# Patient Record
Sex: FEMALE | Race: WHITE | NOT HISPANIC OR LATINO
[De-identification: names, ages, dates, MRNs, and addresses within clinical notes are randomized per-mention and may not be internally consistent; named-entity substitution may affect disease eponyms.]

---

## 2017-11-17 ENCOUNTER — APPOINTMENT (OUTPATIENT)
Dept: OBGYN | Facility: CLINIC | Age: 22
End: 2017-11-17
Payer: COMMERCIAL

## 2017-11-17 VITALS
DIASTOLIC BLOOD PRESSURE: 60 MMHG | OXYGEN SATURATION: 98 % | HEIGHT: 60 IN | WEIGHT: 115 LBS | HEART RATE: 83 BPM | BODY MASS INDEX: 22.58 KG/M2 | SYSTOLIC BLOOD PRESSURE: 106 MMHG

## 2017-11-17 DIAGNOSIS — Z82.49 FAMILY HISTORY OF ISCHEMIC HEART DISEASE AND OTHER DISEASES OF THE CIRCULATORY SYSTEM: ICD-10-CM

## 2017-11-17 PROCEDURE — 99385 PREV VISIT NEW AGE 18-39: CPT

## 2017-11-17 RX ORDER — NEOMYCIN/POLYMYXIN B/PRAMOXINE 3.5-10K-1
CREAM (GRAM) TOPICAL
Refills: 0 | Status: ACTIVE | COMMUNITY

## 2017-11-17 RX ORDER — UBIDECARENONE/VIT E ACET 100MG-5
CAPSULE ORAL
Refills: 0 | Status: ACTIVE | COMMUNITY

## 2017-11-26 LAB — CYTOLOGY CVX/VAG DOC THIN PREP: NORMAL

## 2018-03-13 ENCOUNTER — LABORATORY RESULT (OUTPATIENT)
Age: 23
End: 2018-03-13

## 2018-03-13 ENCOUNTER — APPOINTMENT (OUTPATIENT)
Dept: FAMILY MEDICINE | Facility: CLINIC | Age: 23
End: 2018-03-13
Payer: COMMERCIAL

## 2018-03-13 VITALS
HEIGHT: 60 IN | TEMPERATURE: 98.1 F | WEIGHT: 118 LBS | BODY MASS INDEX: 23.16 KG/M2 | DIASTOLIC BLOOD PRESSURE: 54 MMHG | RESPIRATION RATE: 14 BRPM | HEART RATE: 86 BPM | OXYGEN SATURATION: 98 % | SYSTOLIC BLOOD PRESSURE: 92 MMHG

## 2018-03-13 DIAGNOSIS — B96.89 PERSONAL HISTORY OF OTHER DISEASES OF THE RESPIRATORY SYSTEM: ICD-10-CM

## 2018-03-13 DIAGNOSIS — Z87.09 PERSONAL HISTORY OF OTHER DISEASES OF THE RESPIRATORY SYSTEM: ICD-10-CM

## 2018-03-13 DIAGNOSIS — Z00.00 ENCOUNTER FOR GENERAL ADULT MEDICAL EXAMINATION W/OUT ABNORMAL FINDINGS: ICD-10-CM

## 2018-03-13 PROCEDURE — 99202 OFFICE O/P NEW SF 15 MIN: CPT

## 2018-03-13 RX ORDER — FLUTICASONE PROPIONATE 50 UG/1
50 SPRAY, METERED NASAL
Refills: 0 | Status: DISCONTINUED | COMMUNITY
End: 2018-03-13

## 2018-04-05 LAB
25(OH)D3 SERPL-MCNC: 36.3 NG/ML
ALBUMIN SERPL ELPH-MCNC: 4.6 G/DL
ALP BLD-CCNC: 58 U/L
ALT SERPL-CCNC: 10 U/L
ANION GAP SERPL CALC-SCNC: 13 MMOL/L
APPEARANCE: ABNORMAL
AST SERPL-CCNC: 13 U/L
BASOPHILS # BLD AUTO: 0.03 K/UL
BASOPHILS NFR BLD AUTO: 0.5 %
BILIRUB SERPL-MCNC: 1.2 MG/DL
BILIRUBIN URINE: NEGATIVE
BLOOD URINE: NEGATIVE
BUN SERPL-MCNC: 10 MG/DL
CALCIUM SERPL-MCNC: 9.8 MG/DL
CHLORIDE SERPL-SCNC: 102 MMOL/L
CHOLEST SERPL-MCNC: 204 MG/DL
CHOLEST/HDLC SERPL: 3.3 RATIO
CO2 SERPL-SCNC: 24 MMOL/L
COLOR: YELLOW
CREAT SERPL-MCNC: 0.79 MG/DL
EOSINOPHIL # BLD AUTO: 0.06 K/UL
EOSINOPHIL NFR BLD AUTO: 1.1 %
GLUCOSE QUALITATIVE U: NEGATIVE MG/DL
GLUCOSE SERPL-MCNC: 97 MG/DL
HCT VFR BLD CALC: 39.1 %
HDLC SERPL-MCNC: 62 MG/DL
HGB BLD-MCNC: 12.8 G/DL
IMM GRANULOCYTES NFR BLD AUTO: 0.2 %
KETONES URINE: NEGATIVE
LDLC SERPL CALC-MCNC: 128 MG/DL
LEUKOCYTE ESTERASE URINE: ABNORMAL
LYMPHOCYTES # BLD AUTO: 1.83 K/UL
LYMPHOCYTES NFR BLD AUTO: 32.9 %
MAN DIFF?: NORMAL
MCHC RBC-ENTMCNC: 29 PG
MCHC RBC-ENTMCNC: 32.7 GM/DL
MCV RBC AUTO: 88.7 FL
MEV IGG FLD QL IA: >300 AU/ML
MEV IGG+IGM SER-IMP: POSITIVE
MONOCYTES # BLD AUTO: 0.4 K/UL
MONOCYTES NFR BLD AUTO: 7.2 %
MUV AB SER-ACNC: POSITIVE
MUV IGG SER QL IA: 236 AU/ML
NEUTROPHILS # BLD AUTO: 3.24 K/UL
NEUTROPHILS NFR BLD AUTO: 58.1 %
NITRITE URINE: NEGATIVE
PH URINE: 8
PLATELET # BLD AUTO: 276 K/UL
POTASSIUM SERPL-SCNC: 4.1 MMOL/L
PROT SERPL-MCNC: 7.2 G/DL
PROTEIN URINE: 30 MG/DL
RBC # BLD: 4.41 M/UL
RBC # FLD: 12.3 %
RUBV IGG FLD-ACNC: 3.5 INDEX
RUBV IGG SER-IMP: POSITIVE
SODIUM SERPL-SCNC: 139 MMOL/L
SPECIFIC GRAVITY URINE: 1.03
TRIGL SERPL-MCNC: 72 MG/DL
TSH SERPL-ACNC: 1.3 UIU/ML
UROBILINOGEN URINE: NEGATIVE MG/DL
VZV AB TITR SER: POSITIVE
VZV IGG SER IF-ACNC: 641.9 INDEX
WBC # FLD AUTO: 5.57 K/UL

## 2018-04-12 ENCOUNTER — APPOINTMENT (OUTPATIENT)
Dept: FAMILY MEDICINE | Facility: CLINIC | Age: 23
End: 2018-04-12
Payer: COMMERCIAL

## 2018-04-12 ENCOUNTER — TRANSCRIPTION ENCOUNTER (OUTPATIENT)
Age: 23
End: 2018-04-12

## 2018-04-12 VITALS
BODY MASS INDEX: 23.99 KG/M2 | HEART RATE: 100 BPM | HEIGHT: 59 IN | DIASTOLIC BLOOD PRESSURE: 60 MMHG | OXYGEN SATURATION: 99 % | WEIGHT: 119 LBS | SYSTOLIC BLOOD PRESSURE: 96 MMHG | TEMPERATURE: 97.7 F

## 2018-04-12 DIAGNOSIS — Z23 ENCOUNTER FOR IMMUNIZATION: ICD-10-CM

## 2018-04-12 PROCEDURE — 99395 PREV VISIT EST AGE 18-39: CPT | Mod: 25

## 2018-04-12 PROCEDURE — 90715 TDAP VACCINE 7 YRS/> IM: CPT

## 2018-04-12 PROCEDURE — 99385 PREV VISIT NEW AGE 18-39: CPT | Mod: 25

## 2018-04-12 PROCEDURE — G0009: CPT

## 2018-04-12 PROCEDURE — 90472 IMMUNIZATION ADMIN EACH ADD: CPT

## 2018-04-12 PROCEDURE — 90732 PPSV23 VACC 2 YRS+ SUBQ/IM: CPT

## 2018-06-29 ENCOUNTER — EMERGENCY (EMERGENCY)
Facility: HOSPITAL | Age: 23
LOS: 1 days | Discharge: HOME | End: 2018-06-29
Attending: EMERGENCY MEDICINE | Admitting: EMERGENCY MEDICINE
Payer: COMMERCIAL

## 2018-06-29 VITALS
OXYGEN SATURATION: 100 % | HEIGHT: 60 IN | RESPIRATION RATE: 19 BRPM | SYSTOLIC BLOOD PRESSURE: 97 MMHG | TEMPERATURE: 98 F | WEIGHT: 113.98 LBS | HEART RATE: 98 BPM | DIASTOLIC BLOOD PRESSURE: 69 MMHG

## 2018-06-29 VITALS
HEART RATE: 88 BPM | DIASTOLIC BLOOD PRESSURE: 62 MMHG | SYSTOLIC BLOOD PRESSURE: 105 MMHG | OXYGEN SATURATION: 100 % | RESPIRATION RATE: 18 BRPM

## 2018-06-29 PROCEDURE — 99284 EMERGENCY DEPT VISIT MOD MDM: CPT

## 2018-06-29 PROCEDURE — 99283 EMERGENCY DEPT VISIT LOW MDM: CPT

## 2018-06-29 RX ORDER — BECLOMETHASONE DIPROPIONATE 40 UG/1
2 AEROSOL, METERED RESPIRATORY (INHALATION)
Qty: 1 | Refills: 0
Start: 2018-06-29

## 2018-06-29 RX ORDER — AZITHROMYCIN 500 MG/1
2 TABLET, FILM COATED ORAL
Qty: 6 | Refills: 0
Start: 2018-06-29 | End: 2018-06-29

## 2018-06-29 NOTE — ED PROVIDER NOTE - OBJECTIVE STATEMENT
pt c/o severe cough for last 2 days, productive of yellow sputum assoc c nasal congestion. no sob. nofever/chills. no throat pain. no chestpain

## 2018-06-29 NOTE — ED PROVIDER NOTE - MEDICAL DECISION MAKING DETAILS
cough/congestion. lungs clear. nl vitals. no pna. likely bronchitis. will treat c tessalon, zpak. pmd fu

## 2018-08-06 ENCOUNTER — EMERGENCY (EMERGENCY)
Facility: HOSPITAL | Age: 23
LOS: 1 days | Discharge: HOME | End: 2018-08-06
Attending: EMERGENCY MEDICINE | Admitting: EMERGENCY MEDICINE
Payer: COMMERCIAL

## 2018-08-06 VITALS
OXYGEN SATURATION: 98 % | DIASTOLIC BLOOD PRESSURE: 73 MMHG | WEIGHT: 110.23 LBS | SYSTOLIC BLOOD PRESSURE: 116 MMHG | RESPIRATION RATE: 16 BRPM | TEMPERATURE: 98 F | HEART RATE: 89 BPM

## 2018-08-06 PROCEDURE — 99283 EMERGENCY DEPT VISIT LOW MDM: CPT

## 2018-08-06 RX ADMIN — Medication 1 TABLET(S): at 20:30

## 2018-08-06 NOTE — ED ADULT NURSE NOTE - NSIMPLEMENTINTERV_GEN_ALL_ED
Implemented All Universal Safety Interventions:  [place] to call system. Call bell, personal items and telephone within reach. Instruct patient to call for assistance. Room bathroom lighting operational. Non-slip footwear when patient is off stretcher. Physically safe environment: no spills, clutter or unnecessary equipment. Stretcher in lowest position, wheels locked, appropriate side rails in place.

## 2018-08-06 NOTE — ED PROVIDER NOTE - OBJECTIVE STATEMENT
Pertinent PMH/PSH/FHx/SHx and Review of Systems contained within:  22 y/o F presents to Ed c/o drainage from wound on right forearm for few days

## 2018-08-06 NOTE — ED ADULT TRIAGE NOTE - CHIEF COMPLAINT QUOTE
right forearm redness and swelling patient unsure how she sustained injury x1week denies fever or chills

## 2018-08-06 NOTE — ED PROVIDER NOTE - SKIN WOUND TYPE
PUNCTURE WOUND(S)/small infected wound with purulent discharge on lateral aspect of right forearm with with about 2 cm circular area of erythema and and minor cellulitis

## 2019-01-10 ENCOUNTER — APPOINTMENT (OUTPATIENT)
Dept: FAMILY MEDICINE | Facility: CLINIC | Age: 24
End: 2019-01-10
Payer: MEDICAID

## 2019-01-10 ENCOUNTER — RX RENEWAL (OUTPATIENT)
Age: 24
End: 2019-01-10

## 2019-01-10 VITALS
WEIGHT: 111 LBS | BODY MASS INDEX: 22.38 KG/M2 | SYSTOLIC BLOOD PRESSURE: 108 MMHG | DIASTOLIC BLOOD PRESSURE: 68 MMHG | TEMPERATURE: 98.4 F | RESPIRATION RATE: 16 BRPM | HEIGHT: 59 IN | OXYGEN SATURATION: 98 % | HEART RATE: 80 BPM

## 2019-01-10 PROCEDURE — 99214 OFFICE O/P EST MOD 30 MIN: CPT

## 2019-01-10 RX ORDER — AMOXICILLIN AND CLAVULANATE POTASSIUM 875; 125 MG/1; MG/1
875-125 TABLET, COATED ORAL
Qty: 14 | Refills: 0 | Status: COMPLETED | COMMUNITY
Start: 2018-09-21

## 2019-01-10 RX ORDER — 1.1% SODIUM FLUORIDE PRESCRIPTION DENTAL CREAM 5 MG/G
1.1 CREAM DENTAL
Qty: 51 | Refills: 0 | Status: COMPLETED | COMMUNITY
Start: 2018-06-26

## 2019-01-10 RX ORDER — SULFAMETHOXAZOLE AND TRIMETHOPRIM 800; 160 MG/1; MG/1
800-160 TABLET ORAL
Qty: 20 | Refills: 0 | Status: COMPLETED | COMMUNITY
Start: 2018-08-06

## 2019-01-10 NOTE — REVIEW OF SYSTEMS
[Nasal Discharge] : nasal discharge [Sore Throat] : sore throat [Shortness Of Breath] : no shortness of breath [Wheezing] : no wheezing [Cough] : cough [Dyspnea on Exertion] : no dyspnea on exertion [Negative] : Musculoskeletal

## 2019-01-10 NOTE — PHYSICAL EXAM
[No Acute Distress] : no acute distress [Normal Outer Ear/Nose] : the outer ears and nose were normal in appearance [No JVD] : no jugular venous distention [No Respiratory Distress] : no respiratory distress  [No Accessory Muscle Use] : no accessory muscle use [Normal Rate] : normal rate  [Regular Rhythm] : with a regular rhythm [Normal S1, S2] : normal S1 and S2 [No Varicosities] : no varicosities [No Edema] : there was no peripheral edema [Soft] : abdomen soft [Non Tender] : non-tender [Non-distended] : non-distended [No HSM] : no HSM [Normal Bowel Sounds] : normal bowel sounds [de-identified] : + pharynx erythema, + nasal turbines edema  [de-identified] : + wheezing

## 2019-01-10 NOTE — HISTORY OF PRESENT ILLNESS
[Congestion] : congestion [Cough] : cough [Sore Throat] : sore throat [Moderate] : moderate [___ Days ago] : [unfilled] days ago [Chills] : chills [Fever] : fever [Worsening] : worsening [Wheezing] : no wheezing [Anorexia] : no anorexia [Shortness Of Breath] : no shortness of breath [Fatigue] : not fatigue [Headache] : no headache [FreeTextEntry8] : Patient started Proventil no improvement. Mother sick too. Patient  run out off Singular.

## 2019-02-15 ENCOUNTER — APPOINTMENT (OUTPATIENT)
Dept: FAMILY MEDICINE | Facility: CLINIC | Age: 24
End: 2019-02-15
Payer: MEDICAID

## 2019-02-15 VITALS
BODY MASS INDEX: 21.77 KG/M2 | WEIGHT: 108 LBS | HEIGHT: 59 IN | TEMPERATURE: 98.5 F | DIASTOLIC BLOOD PRESSURE: 64 MMHG | OXYGEN SATURATION: 98 % | HEART RATE: 85 BPM | SYSTOLIC BLOOD PRESSURE: 98 MMHG

## 2019-02-15 DIAGNOSIS — B97.89 ACUTE UPPER RESPIRATORY INFECTION, UNSPECIFIED: ICD-10-CM

## 2019-02-15 DIAGNOSIS — J06.9 ACUTE UPPER RESPIRATORY INFECTION, UNSPECIFIED: ICD-10-CM

## 2019-02-15 PROCEDURE — 99214 OFFICE O/P EST MOD 30 MIN: CPT

## 2019-02-15 RX ORDER — LORATADINE 5 MG/5 ML
10 SOLUTION, ORAL ORAL
Refills: 0 | Status: DISCONTINUED | COMMUNITY
End: 2019-02-15

## 2019-02-15 RX ORDER — BECLOMETHASONE DIPROPIONATE 80 UG/1
80 AEROSOL, METERED RESPIRATORY (INHALATION) TWICE DAILY
Qty: 1 | Refills: 1 | Status: DISCONTINUED | COMMUNITY
Start: 2018-03-13 | End: 2019-02-15

## 2019-02-15 RX ORDER — BENZONATATE 200 MG/1
200 CAPSULE ORAL 3 TIMES DAILY
Qty: 20 | Refills: 1 | Status: DISCONTINUED | COMMUNITY
Start: 2019-01-10 | End: 2019-02-15

## 2019-02-15 RX ORDER — AMOXICILLIN AND CLAVULANATE POTASSIUM 875; 125 MG/1; MG/1
875-125 TABLET, COATED ORAL
Qty: 14 | Refills: 0 | Status: DISCONTINUED | COMMUNITY
Start: 2019-01-10 | End: 2019-02-15

## 2019-02-15 RX ORDER — CETIRIZINE HYDROCHLORIDE 10 MG/1
10 CAPSULE, LIQUID FILLED ORAL
Qty: 15 | Refills: 0 | Status: ACTIVE | COMMUNITY
Start: 2019-02-15 | End: 1900-01-01

## 2019-02-15 NOTE — HISTORY OF PRESENT ILLNESS
[Moderate] : moderate [___ Weeks ago] :  [unfilled] weeks ago [Gradual] : gradually [Constant] : constant [Cough] : cough [Sore Throat] : sore throat [Stable] : stable [Congestion] : no congestion [Wheezing] : no wheezing [Chills] : no chills [Anorexia] : no anorexia [Shortness Of Breath] : no shortness of breath [Earache] : no earache [Fatigue] : not fatigue [Headache] : no headache [Fever] : no fever [FreeTextEntry5] : none [FreeTextEntry4] : unchanged [FreeTextEntry6] : same all week [FreeTextEntry8] : Pt is a 22 yo female presents today with mother symptoms of cough and sore throat for a week. Denies dyspnea, wheezing, SOB, fever, chills. \par Pt does work in dog grooming and  spa. As per mother, also concerned if the pet dander is aggravating her cough symptoms and asthma. Pt denies being allergic to dogs in the past.

## 2019-02-15 NOTE — PHYSICAL EXAM
[No Acute Distress] : no acute distress [Well Nourished] : well nourished [Well-Appearing] : well-appearing [PERRL] : pupils equal round and reactive to light [EOMI] : extraocular movements intact [Normal Oropharynx] : the oropharynx was normal [Supple] : supple [No Respiratory Distress] : no respiratory distress  [Clear to Auscultation] : lungs were clear to auscultation bilaterally [No Accessory Muscle Use] : no accessory muscle use [Normal Rate] : normal rate  [Normal S1, S2] : normal S1 and S2 [No Edema] : there was no peripheral edema [Soft] : abdomen soft [Non Tender] : non-tender [Normal Anterior Cervical Nodes] : no anterior cervical lymphadenopathy [No Rash] : no rash [Normal Gait] : normal gait [de-identified] : no tonsilar enlargement, no erythema, no exudates

## 2019-02-15 NOTE — REVIEW OF SYSTEMS
[Sore Throat] : sore throat [Cough] : cough [Negative] : Heme/Lymph [Fever] : no fever [Chills] : no chills [Nasal Discharge] : no nasal discharge [Chest Pain] : no chest pain [Shortness Of Breath] : no shortness of breath [Wheezing] : no wheezing [Dyspnea on Exertion] : no dyspnea on exertion [Abdominal Pain] : no abdominal pain [Dysuria] : no dysuria [Frequency] : no frequency [Skin Rash] : no skin rash [Suicidal] : not suicidal [Insomnia] : no insomnia [Anxiety] : no anxiety

## 2019-04-08 NOTE — ED PROVIDER NOTE - CONTEXT
unknown
[email or web address]

## 2019-05-09 ENCOUNTER — TRANSCRIPTION ENCOUNTER (OUTPATIENT)
Age: 24
End: 2019-05-09

## 2019-06-25 NOTE — ED ADULT NURSE NOTE - PSH
----- Message from Felicia Valenzuela sent at 6/25/2019  2:57 PM CDT -----  ..Type:  Patient Returning Call    Who Called:pt   Who Left Message for Patient:  Does the patient know what this is regarding?: birth control  Would the patient rather a call back or a response via [facility]? Call back   Best Call Back Number: [phone]  Additional Information: pt is requesting a call from nurse to schedule birth control injection    No significant past surgical history

## 2020-01-23 ENCOUNTER — RX RENEWAL (OUTPATIENT)
Age: 25
End: 2020-01-23

## 2020-02-23 ENCOUNTER — RX RENEWAL (OUTPATIENT)
Age: 25
End: 2020-02-23

## 2020-06-23 ENCOUNTER — TRANSCRIPTION ENCOUNTER (OUTPATIENT)
Age: 25
End: 2020-06-23

## 2020-10-27 ENCOUNTER — APPOINTMENT (OUTPATIENT)
Dept: FAMILY MEDICINE | Facility: CLINIC | Age: 25
End: 2020-10-27
Payer: MEDICAID

## 2020-10-27 VITALS
TEMPERATURE: 98.3 F | HEART RATE: 99 BPM | DIASTOLIC BLOOD PRESSURE: 72 MMHG | WEIGHT: 145 LBS | SYSTOLIC BLOOD PRESSURE: 108 MMHG | RESPIRATION RATE: 16 BRPM | OXYGEN SATURATION: 100 % | HEIGHT: 59 IN | BODY MASS INDEX: 29.23 KG/M2

## 2020-10-27 DIAGNOSIS — J45.909 UNSPECIFIED ASTHMA, UNCOMPLICATED: ICD-10-CM

## 2020-10-27 PROCEDURE — G0008: CPT

## 2020-10-27 PROCEDURE — 99072 ADDL SUPL MATRL&STAF TM PHE: CPT

## 2020-10-27 PROCEDURE — 90686 IIV4 VACC NO PRSV 0.5 ML IM: CPT

## 2020-10-27 PROCEDURE — 99214 OFFICE O/P EST MOD 30 MIN: CPT | Mod: 25

## 2020-10-27 RX ORDER — PROMETHAZINE HYDROCHLORIDE AND DEXTROMETHORPHAN HYDROBROMIDE ORAL SOLUTION 15; 6.25 MG/5ML; MG/5ML
6.25-15 SOLUTION ORAL
Qty: 1 | Refills: 0 | Status: COMPLETED | COMMUNITY
Start: 2019-02-15 | End: 2020-10-27

## 2020-10-27 RX ORDER — BENZONATATE 100 MG/1
100 CAPSULE ORAL
Qty: 30 | Refills: 0 | Status: COMPLETED | COMMUNITY
Start: 2019-02-15 | End: 2020-10-27

## 2020-10-27 NOTE — HEALTH RISK ASSESSMENT
[] : No [1 or 2 (0 pts)] : 1 or 2 (0 points) [Never (0 pts)] : Never (0 points) [No] : In the past 12 months have you used drugs other than those required for medical reasons? No [0] : 2) Feeling down, depressed, or hopeless: Not at all (0) [de-identified] : No [Audit-CScore] : 0 [de-identified] : walking  [de-identified] : Regulax  [IAC2Nfcuc] : 0

## 2020-10-27 NOTE — REVIEW OF SYSTEMS
[Abdominal Pain] : no abdominal pain [Nausea] : nausea [Constipation] : no constipation [Diarrhea] : diarrhea [Vomiting] : vomiting [Heartburn] : no heartburn [Melena] : no melena [Negative] : Psychiatric

## 2020-10-27 NOTE — COUNSELING
[Benefits of weight loss discussed] : Benefits of weight loss discussed [Encouraged to maintain food diary] : Encouraged to maintain food diary [Encouraged to increase physical activity] : Encouraged to increase physical activity [Encouraged to use exercise tracking device] : Encouraged to use exercise tracking device [Weigh Self Weekly] : weigh self weekly [Decrease Portions] : decrease portions [____ min/wk Activity] : [unfilled] min/wk activity [Keep Food Diary] : keep food diary [Good understanding] : Patient has a good understanding of lifestyle changes and steps needed to achieve self management goal

## 2020-10-27 NOTE — HISTORY OF PRESENT ILLNESS
[Parent] : parent [FreeTextEntry1] : cc: asthma, nausea /vomiting ,Flu shot  [de-identified] : Patient was brought by her mother , she needs to f/u on her Asthma, needs a FLu shit, She c/o N/V on and off, no abd pain, chronic, no HA now ( Hx of HA in the past ), she deneis CP, + weight gain, when is hot she notice that it happens more often.

## 2020-10-27 NOTE — PHYSICAL EXAM
[No Acute Distress] : no acute distress [No Varicosities] : no varicosities [No Edema] : there was no peripheral edema [Alert and Oriented x3] : oriented to person, place, and time [Normal] : affect was normal and insight and judgment were intact

## 2020-11-24 ENCOUNTER — RX RENEWAL (OUTPATIENT)
Age: 25
End: 2020-11-24

## 2020-12-02 ENCOUNTER — NON-APPOINTMENT (OUTPATIENT)
Age: 25
End: 2020-12-02

## 2020-12-02 ENCOUNTER — APPOINTMENT (OUTPATIENT)
Dept: FAMILY MEDICINE | Facility: CLINIC | Age: 25
End: 2020-12-02
Payer: MEDICAID

## 2020-12-02 VITALS
WEIGHT: 146 LBS | HEIGHT: 59 IN | OXYGEN SATURATION: 97 % | BODY MASS INDEX: 29.43 KG/M2 | HEART RATE: 84 BPM | RESPIRATION RATE: 16 BRPM | SYSTOLIC BLOOD PRESSURE: 100 MMHG | TEMPERATURE: 98.2 F | DIASTOLIC BLOOD PRESSURE: 70 MMHG

## 2020-12-02 PROCEDURE — 99072 ADDL SUPL MATRL&STAF TM PHE: CPT

## 2020-12-02 PROCEDURE — 93000 ELECTROCARDIOGRAM COMPLETE: CPT

## 2020-12-02 PROCEDURE — 99395 PREV VISIT EST AGE 18-39: CPT | Mod: 25

## 2020-12-02 NOTE — PHYSICAL EXAM
[No Acute Distress] : no acute distress [Well Nourished] : well nourished [Well Developed] : well developed [Well-Appearing] : well-appearing [Normal Sclera/Conjunctiva] : normal sclera/conjunctiva [PERRL] : pupils equal round and reactive to light [EOMI] : extraocular movements intact [Normal Outer Ear/Nose] : the outer ears and nose were normal in appearance [Normal Oropharynx] : the oropharynx was normal [No JVD] : no jugular venous distention [Supple] : supple [No Respiratory Distress] : no respiratory distress  [Clear to Auscultation] : lungs were clear to auscultation bilaterally [No Accessory Muscle Use] : no accessory muscle use [Normal Rate] : normal rate  [Regular Rhythm] : with a regular rhythm [Normal S1, S2] : normal S1 and S2 [No Murmur] : no murmur heard [No Carotid Bruits] : no carotid bruits [No Abdominal Bruit] : a ~M bruit was not heard ~T in the abdomen [No Varicosities] : no varicosities [Pedal Pulses Present] : the pedal pulses are present [No Edema] : there was no peripheral edema [No Extremity Clubbing/Cyanosis] : no extremity clubbing/cyanosis [No Palpable Aorta] : no palpable aorta [Soft] : abdomen soft [Non Tender] : non-tender [Non-distended] : non-distended [No HSM] : no HSM [Normal Bowel Sounds] : normal bowel sounds [Normal Posterior Cervical Nodes] : no posterior cervical lymphadenopathy [Normal Anterior Cervical Nodes] : no anterior cervical lymphadenopathy [No CVA Tenderness] : no CVA  tenderness [No Spinal Tenderness] : no spinal tenderness [No Joint Swelling] : no joint swelling [Grossly Normal Strength/Tone] : grossly normal strength/tone [No Rash] : no rash [Normal Gait] : normal gait [Coordination Grossly Intact] : coordination grossly intact [No Focal Deficits] : no focal deficits [Deep Tendon Reflexes (DTR)] : deep tendon reflexes were 2+ and symmetric [Normal Affect] : the affect was normal [Alert and Oriented x3] : oriented to person, place, and time [Normal Insight/Judgement] : insight and judgment were intact [Normal Appearance] : normal in appearance [No Masses] : no palpable masses [No Nipple Discharge] : no nipple discharge [No Axillary Lymphadenopathy] : no axillary lymphadenopathy

## 2020-12-02 NOTE — HISTORY OF PRESENT ILLNESS
[FreeTextEntry1] : cc: physical  [de-identified] : Patient came  for physical. Denies CP,SOB,Abd pain, no N,V,C,D. Patient's N/V  improved. She did not have a chance to do blood work or US of  the  abd. Asthma well controlled .Pt c/o dry cough,on and off, probably repeated to Asthma \par

## 2020-12-02 NOTE — COUNSELING
[Activity counseling provided] : activity [Walking] : Walking [Benefits of weight loss discussed] : Benefits of weight loss discussed [Encouraged to maintain food diary] : Encouraged to maintain food diary [Encouraged to increase physical activity] : Encouraged to increase physical activity [Encouraged to use exercise tracking device] : Encouraged to use exercise tracking device [Weigh Self Weekly] : weigh self weekly [Decrease Portions] : decrease portions [____ min/wk Activity] : [unfilled] min/wk activity [Keep Food Diary] : keep food diary [Good understanding] : Patient has a good understanding of disease, goals and obesity follow-up plan

## 2020-12-02 NOTE — HEALTH RISK ASSESSMENT
[Very Good] : ~his/her~  mood as very good [1 or 2 (0 pts)] : 1 or 2 (0 points) [Never (0 pts)] : Never (0 points) [No] : In the past 12 months have you used drugs other than those required for medical reasons? No [Patient reported PAP Smear was normal] : Patient reported PAP Smear was normal [HIV Test offered] : HIV Test offered [Hepatitis C test offered] : Hepatitis C test offered [None] : None [With Family] : lives with family [Unemployed] : unemployed [High School] : high school [Single] : single [Feels Safe at Home] : Feels safe at home [Smoke Detector] : smoke detector [Carbon Monoxide Detector] : carbon monoxide detector [Seat Belt] :  uses seat belt [Sunscreen] : uses sunscreen [With Patient/Caregiver] : With Patient/Caregiver [Discussed at today's visit] : Advance Directives Discussed at today's visit [Aggressive treatment] : aggressive treatment [FreeTextEntry1] : + dry cough  [] : No [de-identified] : No [de-identified] : no [Audit-CScore] : 0 [de-identified] : none [de-identified] : regular  [Change in mental status noted] : No change in mental status noted [Language] : denies difficulty with language [Behavior] : denies difficulty with behavior [Reasoning] : denies difficulty with reasoning [Sexually Active] : not sexually active [Reports changes in hearing] : Reports no changes in hearing [Reports changes in vision] : Reports no changes in vision [Reports changes in dental health] : Reports no changes in dental health [MammogramDate] : never [MammogramComments] : NA [PapSmearDate] : 11/17 [BoneDensityDate] : never [BoneDensityComments] : NA [ColonoscopyDate] : never [ColonoscopyComments] : NA [AdvancecareDate] : 12/20

## 2020-12-05 ENCOUNTER — OUTPATIENT (OUTPATIENT)
Dept: OUTPATIENT SERVICES | Facility: HOSPITAL | Age: 25
LOS: 1 days | End: 2020-12-05
Payer: MEDICAID

## 2020-12-05 ENCOUNTER — APPOINTMENT (OUTPATIENT)
Dept: ULTRASOUND IMAGING | Facility: HOSPITAL | Age: 25
End: 2020-12-05
Payer: MEDICAID

## 2020-12-05 DIAGNOSIS — R11.2 NAUSEA WITH VOMITING, UNSPECIFIED: ICD-10-CM

## 2020-12-05 PROCEDURE — 76700 US EXAM ABDOM COMPLETE: CPT | Mod: 26

## 2020-12-05 PROCEDURE — 76700 US EXAM ABDOM COMPLETE: CPT

## 2020-12-06 LAB
ALBUMIN SERPL ELPH-MCNC: 4.7 G/DL
ALP BLD-CCNC: 85 U/L
ALT SERPL-CCNC: 19 U/L
AMYLASE/CREAT SERPL: 37 U/L
ANION GAP SERPL CALC-SCNC: 12 MMOL/L
APPEARANCE: CLEAR
AST SERPL-CCNC: 16 U/L
BASOPHILS # BLD AUTO: 0.05 K/UL
BASOPHILS NFR BLD AUTO: 0.9 %
BILIRUB SERPL-MCNC: 0.6 MG/DL
BILIRUBIN URINE: NEGATIVE
BLOOD URINE: NEGATIVE
BUN SERPL-MCNC: 10 MG/DL
CALCIUM SERPL-MCNC: 9.8 MG/DL
CHLORIDE SERPL-SCNC: 104 MMOL/L
CHOLEST SERPL-MCNC: 223 MG/DL
CO2 SERPL-SCNC: 26 MMOL/L
COLOR: NORMAL
CREAT SERPL-MCNC: 0.73 MG/DL
EOSINOPHIL # BLD AUTO: 0.1 K/UL
EOSINOPHIL NFR BLD AUTO: 1.8 %
ESTIMATED AVERAGE GLUCOSE: 103 MG/DL
GGT SERPL-CCNC: 13 U/L
GLUCOSE QUALITATIVE U: NEGATIVE
GLUCOSE SERPL-MCNC: 98 MG/DL
HBA1C MFR BLD HPLC: 5.2 %
HCT VFR BLD CALC: 41 %
HDLC SERPL-MCNC: 58 MG/DL
HGB BLD-MCNC: 13.3 G/DL
IMM GRANULOCYTES NFR BLD AUTO: 0.6 %
KETONES URINE: NEGATIVE
LDLC SERPL CALC-MCNC: 152 MG/DL
LEUKOCYTE ESTERASE URINE: NEGATIVE
LPL SERPL-CCNC: 31 U/L
LYMPHOCYTES # BLD AUTO: 1.6 K/UL
LYMPHOCYTES NFR BLD AUTO: 29.5 %
MAN DIFF?: NORMAL
MCHC RBC-ENTMCNC: 29.2 PG
MCHC RBC-ENTMCNC: 32.4 GM/DL
MCV RBC AUTO: 90.1 FL
MONOCYTES # BLD AUTO: 0.36 K/UL
MONOCYTES NFR BLD AUTO: 6.6 %
NEUTROPHILS # BLD AUTO: 3.29 K/UL
NEUTROPHILS NFR BLD AUTO: 60.6 %
NITRITE URINE: NEGATIVE
NONHDLC SERPL-MCNC: 165 MG/DL
PH URINE: 7.5
PLATELET # BLD AUTO: 314 K/UL
POTASSIUM SERPL-SCNC: 4.3 MMOL/L
PROT SERPL-MCNC: 7.2 G/DL
PROTEIN URINE: NEGATIVE
RBC # BLD: 4.55 M/UL
RBC # FLD: 11.9 %
SODIUM SERPL-SCNC: 142 MMOL/L
SPECIFIC GRAVITY URINE: 1.01
TRIGL SERPL-MCNC: 66 MG/DL
TSH SERPL-ACNC: 1.41 UIU/ML
URATE SERPL-MCNC: 4.6 MG/DL
UROBILINOGEN URINE: NORMAL
WBC # FLD AUTO: 5.43 K/UL

## 2020-12-20 ENCOUNTER — TRANSCRIPTION ENCOUNTER (OUTPATIENT)
Age: 25
End: 2020-12-20

## 2020-12-21 PROBLEM — J06.9 VIRAL URI WITH COUGH: Status: RESOLVED | Noted: 2019-02-15 | Resolved: 2020-12-21

## 2021-02-02 ENCOUNTER — TRANSCRIPTION ENCOUNTER (OUTPATIENT)
Age: 26
End: 2021-02-02

## 2021-03-08 ENCOUNTER — APPOINTMENT (OUTPATIENT)
Dept: FAMILY MEDICINE | Facility: CLINIC | Age: 26
End: 2021-03-08
Payer: MEDICAID

## 2021-03-08 VITALS
WEIGHT: 141 LBS | BODY MASS INDEX: 28.48 KG/M2 | TEMPERATURE: 98 F | DIASTOLIC BLOOD PRESSURE: 66 MMHG | RESPIRATION RATE: 14 BRPM | SYSTOLIC BLOOD PRESSURE: 100 MMHG | HEART RATE: 96 BPM | OXYGEN SATURATION: 98 %

## 2021-03-08 PROCEDURE — 99072 ADDL SUPL MATRL&STAF TM PHE: CPT

## 2021-03-08 PROCEDURE — 99214 OFFICE O/P EST MOD 30 MIN: CPT | Mod: 25

## 2021-03-09 LAB
ALBUMIN SERPL ELPH-MCNC: 4.7 G/DL
ALP BLD-CCNC: 79 U/L
ALT SERPL-CCNC: 15 U/L
ANION GAP SERPL CALC-SCNC: 12 MMOL/L
AST SERPL-CCNC: 16 U/L
BASOPHILS # BLD AUTO: 0.04 K/UL
BASOPHILS NFR BLD AUTO: 0.8 %
BILIRUB SERPL-MCNC: 0.6 MG/DL
BUN SERPL-MCNC: 9 MG/DL
CALCIUM SERPL-MCNC: 9.9 MG/DL
CHLORIDE SERPL-SCNC: 105 MMOL/L
CO2 SERPL-SCNC: 22 MMOL/L
CREAT SERPL-MCNC: 0.8 MG/DL
EOSINOPHIL # BLD AUTO: 0.07 K/UL
EOSINOPHIL NFR BLD AUTO: 1.4 %
GLUCOSE SERPL-MCNC: 81 MG/DL
HCT VFR BLD CALC: 40.1 %
HGB BLD-MCNC: 13.3 G/DL
IMM GRANULOCYTES NFR BLD AUTO: 0.4 %
INR PPP: 1.04 RATIO
LYMPHOCYTES # BLD AUTO: 1.49 K/UL
LYMPHOCYTES NFR BLD AUTO: 29.4 %
MAN DIFF?: NORMAL
MCHC RBC-ENTMCNC: 29 PG
MCHC RBC-ENTMCNC: 33.2 GM/DL
MCV RBC AUTO: 87.6 FL
MONOCYTES # BLD AUTO: 0.37 K/UL
MONOCYTES NFR BLD AUTO: 7.3 %
NEUTROPHILS # BLD AUTO: 3.07 K/UL
NEUTROPHILS NFR BLD AUTO: 60.7 %
PLATELET # BLD AUTO: 354 K/UL
POTASSIUM SERPL-SCNC: 4 MMOL/L
PROT SERPL-MCNC: 7.1 G/DL
PT BLD: 12.3 SEC
RBC # BLD: 4.58 M/UL
RBC # FLD: 11.9 %
SODIUM SERPL-SCNC: 140 MMOL/L
WBC # FLD AUTO: 5.06 K/UL

## 2021-03-14 NOTE — HISTORY OF PRESENT ILLNESS
[FreeTextEntry8] : cc: frequent nose bleeds.f/u Asthma \par Patient came c/o frequent nose bleeds,she deneis  fever,chills,no trauma, she notices dry blood n  her nose,on and off nose bleeds.Patient denies CP,SOB,Abd pain. Deneis easy brusing.  Asthma well controlled

## 2021-03-14 NOTE — PHYSICAL EXAM
[Normal Outer Ear/Nose] : the outer ears and nose were normal in appearance [Normal Oropharynx] : the oropharynx was normal [Normal TMs] : both tympanic membranes were normal [Alert and Oriented x3] : oriented to person, place, and time [Normal] : affect was normal and insight and judgment were intact [de-identified] : nasal cannals - no fresh blood or dry noted

## 2021-03-18 ENCOUNTER — TRANSCRIPTION ENCOUNTER (OUTPATIENT)
Age: 26
End: 2021-03-18

## 2021-03-31 ENCOUNTER — NON-APPOINTMENT (OUTPATIENT)
Age: 26
End: 2021-03-31

## 2021-04-01 ENCOUNTER — TRANSCRIPTION ENCOUNTER (OUTPATIENT)
Age: 26
End: 2021-04-01

## 2021-04-12 ENCOUNTER — APPOINTMENT (OUTPATIENT)
Dept: FAMILY MEDICINE | Facility: CLINIC | Age: 26
End: 2021-04-12
Payer: MEDICAID

## 2021-04-12 VITALS
OXYGEN SATURATION: 99 % | DIASTOLIC BLOOD PRESSURE: 72 MMHG | TEMPERATURE: 97.8 F | HEART RATE: 93 BPM | WEIGHT: 143 LBS | HEIGHT: 59 IN | RESPIRATION RATE: 14 BRPM | SYSTOLIC BLOOD PRESSURE: 94 MMHG | BODY MASS INDEX: 28.83 KG/M2

## 2021-04-12 DIAGNOSIS — R04.0 EPISTAXIS: ICD-10-CM

## 2021-04-12 PROCEDURE — 99213 OFFICE O/P EST LOW 20 MIN: CPT

## 2021-04-12 PROCEDURE — 99072 ADDL SUPL MATRL&STAF TM PHE: CPT

## 2021-04-13 PROBLEM — R04.0 EPISTAXIS, RECURRENT: Status: ACTIVE | Noted: 2021-03-08

## 2021-04-13 NOTE — REVIEW OF SYSTEMS
[Shortness Of Breath] : no shortness of breath [Wheezing] : no wheezing [Cough] : cough [Dyspnea on Exertion] : no dyspnea on exertion [Negative] : Integumentary

## 2021-04-13 NOTE — HISTORY OF PRESENT ILLNESS
[FreeTextEntry8] : cc: f/u Epistaxis, .f/u Asthma \par Patient presented with her mother to f/u on her epistaxis, seen by ENT , s/p cath, nasal cavity vessels, doing much better, no  epistaxis. Patient denies CP,SOB,Abd pain.  patient c/o more frequent cough on and off, no SOB,no fever, she is being tested for COVID frequently  - negative, have not had PFT in the recent past.

## 2021-04-23 ENCOUNTER — TRANSCRIPTION ENCOUNTER (OUTPATIENT)
Age: 26
End: 2021-04-23

## 2021-05-07 ENCOUNTER — APPOINTMENT (OUTPATIENT)
Dept: OBGYN | Facility: CLINIC | Age: 26
End: 2021-05-07
Payer: MEDICAID

## 2021-05-07 VITALS
HEART RATE: 103 BPM | BODY MASS INDEX: 28.83 KG/M2 | WEIGHT: 143 LBS | HEIGHT: 59 IN | SYSTOLIC BLOOD PRESSURE: 122 MMHG | DIASTOLIC BLOOD PRESSURE: 81 MMHG

## 2021-05-07 DIAGNOSIS — N94.6 DYSMENORRHEA, UNSPECIFIED: ICD-10-CM

## 2021-05-07 DIAGNOSIS — Z01.419 ENCOUNTER FOR GYNECOLOGICAL EXAMINATION (GENERAL) (ROUTINE) W/OUT ABNORMAL FINDINGS: ICD-10-CM

## 2021-05-07 PROCEDURE — 99385 PREV VISIT NEW AGE 18-39: CPT

## 2021-05-07 PROCEDURE — 99072 ADDL SUPL MATRL&STAF TM PHE: CPT

## 2021-05-11 ENCOUNTER — APPOINTMENT (OUTPATIENT)
Dept: DISASTER EMERGENCY | Facility: OTHER | Age: 26
End: 2021-05-11

## 2021-05-11 LAB — CYTOLOGY CVX/VAG DOC THIN PREP: NORMAL

## 2021-05-17 ENCOUNTER — ASOB RESULT (OUTPATIENT)
Age: 26
End: 2021-05-17

## 2021-05-17 ENCOUNTER — APPOINTMENT (OUTPATIENT)
Dept: OBGYN | Facility: CLINIC | Age: 26
End: 2021-05-17
Payer: MEDICAID

## 2021-05-17 PROCEDURE — 99072 ADDL SUPL MATRL&STAF TM PHE: CPT

## 2021-05-17 PROCEDURE — 76856 US EXAM PELVIC COMPLETE: CPT

## 2021-05-25 ENCOUNTER — LABORATORY RESULT (OUTPATIENT)
Age: 26
End: 2021-05-25

## 2021-05-25 ENCOUNTER — APPOINTMENT (OUTPATIENT)
Dept: PULMONOLOGY | Facility: CLINIC | Age: 26
End: 2021-05-25
Payer: MEDICAID

## 2021-05-25 VITALS
HEIGHT: 59 IN | TEMPERATURE: 98 F | BODY MASS INDEX: 29.08 KG/M2 | SYSTOLIC BLOOD PRESSURE: 115 MMHG | DIASTOLIC BLOOD PRESSURE: 70 MMHG | HEART RATE: 113 BPM | WEIGHT: 144.25 LBS | OXYGEN SATURATION: 99 %

## 2021-05-25 DIAGNOSIS — Z02.82 ENCOUNTER FOR ADOPTION SERVICES: ICD-10-CM

## 2021-05-25 PROCEDURE — 99204 OFFICE O/P NEW MOD 45 MIN: CPT

## 2021-05-25 NOTE — ASSESSMENT
[FreeTextEntry1] : This likely represents mild persistent asthma of uncertain etiology.  Asthma testing asthma and allergy testing ordered today.  Patient has been encouraged to use Qvar on regular basis 1 puff twice daily continue with albuterol as needed continue with Singulair daily.  Follow-up after blood tests are done.  Patient is aware to call if she is using her inhaler more frequently.

## 2021-05-25 NOTE — REVIEW OF SYSTEMS
[Nasal Congestion] : nasal congestion [Cough] : cough [Hemoptysis] : no hemoptysis [Sputum] : no sputum [Dyspnea] : no dyspnea [Wheezing] : wheezing [SOB on Exertion] : no sob on exertion [Negative] : Endocrine

## 2021-05-25 NOTE — PHYSICAL EXAM
[No Acute Distress] : no acute distress [Normal Oropharynx] : normal oropharynx [Normal Appearance] : normal appearance [No Neck Mass] : no neck mass [Normal Rate/Rhythm] : normal rate/rhythm [Normal S1, S2] : normal s1, s2 [No Murmurs] : no murmurs [No Resp Distress] : no resp distress [Wheeze] : wheeze [No Abnormalities] : no abnormalities [Benign] : benign [Normal Gait] : normal gait [No Clubbing] : no clubbing [No Cyanosis] : no cyanosis [No Edema] : no edema [FROM] : FROM [Normal Color/ Pigmentation] : normal color/ pigmentation [No Focal Deficits] : no focal deficits [Oriented x3] : oriented x3 [Normal Affect] : normal affect [TextBox_68] : Minimal wheezing

## 2021-05-26 ENCOUNTER — NON-APPOINTMENT (OUTPATIENT)
Age: 26
End: 2021-05-26

## 2021-05-31 LAB
A ALTERNATA IGE QN: <0.1 KUA/L
A FUMIGATUS IGE QN: <0.1 KUA/L
BERMUDA GRASS IGE QN: <0.1 KUA/L
BOXELDER IGE QN: <0.1 KUA/L
C HERBARUM IGE QN: <0.1 KUA/L
CALIF WALNUT IGE QN: <0.1 KUA/L
CAT DANDER IGE QN: <0.1 KUA/L
CMN PIGWEED IGE QN: <0.1 KUA/L
COMMON RAGWEED IGE QN: <0.1 KUA/L
COTTONWOOD IGE QN: <0.1 KUA/L
D FARINAE IGE QN: <0.1 KUA/L
D PTERONYSS IGE QN: <0.1 KUA/L
DEPRECATED A ALTERNATA IGE RAST QL: 0
DEPRECATED A FUMIGATUS IGE RAST QL: 0
DEPRECATED BERMUDA GRASS IGE RAST QL: 0
DEPRECATED BOXELDER IGE RAST QL: 0
DEPRECATED C HERBARUM IGE RAST QL: 0
DEPRECATED CAT DANDER IGE RAST QL: 0
DEPRECATED COMMON PIGWEED IGE RAST QL: 0
DEPRECATED COMMON RAGWEED IGE RAST QL: 0
DEPRECATED COTTONWOOD IGE RAST QL: 0
DEPRECATED D FARINAE IGE RAST QL: 0
DEPRECATED D PTERONYSS IGE RAST QL: 0
DEPRECATED DOG DANDER IGE RAST QL: 0
DEPRECATED GOOSEFOOT IGE RAST QL: 0
DEPRECATED LONDON PLANE IGE RAST QL: 0
DEPRECATED MOUSE URINE PROT IGE RAST QL: 0
DEPRECATED MUGWORT IGE RAST QL: 0
DEPRECATED P NOTATUM IGE RAST QL: 0
DEPRECATED RED CEDAR IGE RAST QL: 0
DEPRECATED ROACH IGE RAST QL: 0
DEPRECATED SHEEP SORREL IGE RAST QL: 0
DEPRECATED SILVER BIRCH IGE RAST QL: 0
DEPRECATED TIMOTHY IGE RAST QL: 0
DEPRECATED WHITE ASH IGE RAST QL: 0
DEPRECATED WHITE OAK IGE RAST QL: 0
DOG DANDER IGE QN: <0.1 KUA/L
GOOSEFOOT IGE QN: <0.1 KUA/L
LONDON PLANE IGE QN: <0.1 KUA/L
MOUSE URINE PROT IGE QN: <0.1 KUA/L
MUGWORT IGE QN: <0.1 KUA/L
MULBERRY (T70) CLASS: 0
MULBERRY (T70) CONC: <0.1 KUA/L
P NOTATUM IGE QN: <0.1 KUA/L
RED CEDAR IGE QN: <0.1 KUA/L
ROACH IGE QN: <0.1 KUA/L
SHEEP SORREL IGE QN: <0.1 KUA/L
SILVER BIRCH IGE QN: <0.1 KUA/L
TIMOTHY IGE QN: <0.1 KUA/L
TOTAL IGE SMQN RAST: 40 KU/L
TREE ALLERG MIX1 IGE QL: 0
WHITE ASH IGE QN: <0.1 KUA/L
WHITE ELM IGE QN: 0
WHITE ELM IGE QN: <0.1 KUA/L
WHITE OAK IGE QN: <0.1 KUA/L

## 2021-06-16 ENCOUNTER — APPOINTMENT (OUTPATIENT)
Dept: PULMONOLOGY | Facility: CLINIC | Age: 26
End: 2021-06-16
Payer: MEDICAID

## 2021-06-16 VITALS
WEIGHT: 140 LBS | HEART RATE: 99 BPM | OXYGEN SATURATION: 97 % | BODY MASS INDEX: 28.22 KG/M2 | TEMPERATURE: 98.4 F | HEIGHT: 59 IN

## 2021-06-16 PROCEDURE — 94726 PLETHYSMOGRAPHY LUNG VOLUMES: CPT

## 2021-06-16 PROCEDURE — 94729 DIFFUSING CAPACITY: CPT

## 2021-06-16 PROCEDURE — 94010 BREATHING CAPACITY TEST: CPT

## 2021-06-25 ENCOUNTER — APPOINTMENT (OUTPATIENT)
Dept: PULMONOLOGY | Facility: CLINIC | Age: 26
End: 2021-06-25
Payer: MEDICAID

## 2021-06-25 VITALS
TEMPERATURE: 97.9 F | WEIGHT: 140 LBS | BODY MASS INDEX: 28.22 KG/M2 | HEART RATE: 91 BPM | OXYGEN SATURATION: 98 % | HEIGHT: 59 IN | DIASTOLIC BLOOD PRESSURE: 70 MMHG | SYSTOLIC BLOOD PRESSURE: 114 MMHG

## 2021-06-25 PROCEDURE — 99212 OFFICE O/P EST SF 10 MIN: CPT

## 2021-06-25 NOTE — REVIEW OF SYSTEMS
Test(s) completed: Pap smear   Results: per Dr Jonathan Myers \"This is ok   Have her come in 6 months to repeat\"  Plan: left patient a message to call back for results. [Nasal Congestion] : no nasal congestion [Cough] : no cough [Hemoptysis] : no hemoptysis [Sputum] : no sputum [Dyspnea] : no dyspnea [Wheezing] : no wheezing [SOB on Exertion] : no sob on exertion [Negative] : Endocrine

## 2021-06-25 NOTE — PHYSICAL EXAM
[No Acute Distress] : no acute distress [Normal Oropharynx] : normal oropharynx [Normal Appearance] : normal appearance [No Neck Mass] : no neck mass [Normal Rate/Rhythm] : normal rate/rhythm [Normal S1, S2] : normal s1, s2 [No Murmurs] : no murmurs [No Resp Distress] : no resp distress [Clear to Auscultation Bilaterally] : clear to auscultation bilaterally [Wheeze] : wheeze [No Abnormalities] : no abnormalities [Benign] : benign [Normal Gait] : normal gait [No Clubbing] : no clubbing [No Cyanosis] : no cyanosis [No Edema] : no edema [FROM] : FROM [Normal Color/ Pigmentation] : normal color/ pigmentation [No Focal Deficits] : no focal deficits [Oriented x3] : oriented x3 [Normal Affect] : normal affect [TextBox_68] : Minimal wheezing

## 2021-07-28 ENCOUNTER — APPOINTMENT (OUTPATIENT)
Dept: FAMILY MEDICINE | Facility: CLINIC | Age: 26
End: 2021-07-28

## 2021-08-23 ENCOUNTER — APPOINTMENT (OUTPATIENT)
Dept: FAMILY MEDICINE | Facility: CLINIC | Age: 26
End: 2021-08-23
Payer: MEDICAID

## 2021-08-23 VITALS
TEMPERATURE: 97.6 F | WEIGHT: 142 LBS | HEART RATE: 89 BPM | HEIGHT: 59 IN | BODY MASS INDEX: 28.63 KG/M2 | RESPIRATION RATE: 15 BRPM | SYSTOLIC BLOOD PRESSURE: 100 MMHG | DIASTOLIC BLOOD PRESSURE: 66 MMHG | OXYGEN SATURATION: 98 %

## 2021-08-23 PROCEDURE — 99215 OFFICE O/P EST HI 40 MIN: CPT

## 2021-08-27 NOTE — COUNSELING
[Benefits of weight loss discussed] : Benefits of weight loss discussed [Encouraged to maintain food diary] : Encouraged to maintain food diary [Encouraged to increase physical activity] : Encouraged to increase physical activity [Encouraged to use exercise tracking device] : Encouraged to use exercise tracking device [Decrease Portions] : decrease portions [Weigh Self Weekly] : weigh self weekly [____ min/wk Activity] : [unfilled] min/wk activity [Keep Food Diary] : keep food diary [Good understanding] : Patient has a good understanding of lifestyle changes and steps needed to achieve self management goal

## 2021-08-27 NOTE — HEALTH RISK ASSESSMENT
[] : No [No] : In the past 12 months have you used drugs other than those required for medical reasons? No [0] : 2) Feeling down, depressed, or hopeless: Not at all (0) [de-identified] : Pulmon  [Audit-CScore] : 0 [de-identified] : walking  [de-identified] : regural  [[id number]] : 0

## 2021-08-27 NOTE — HISTORY OF PRESENT ILLNESS
[Parent] : parent [FreeTextEntry1] : cc: f/u Asthma, Eczema [de-identified] : PAtient came c/o dry skin on her hands,feet, few weeks, not getting better, She deneis CP,SOB,adb pain, Asthma well controlled, seen by the Pulmon

## 2021-08-27 NOTE — PHYSICAL EXAM
[Alert and Oriented x3] : oriented to person, place, and time [Normal] : affect was normal and insight and judgment were intact [de-identified] : b/l feet /hands + eczema

## 2021-08-27 NOTE — REVIEW OF SYSTEMS
[Itching] : Itching [Mole Changes] : no mole changes [Nail Changes] : no nail changes [Hair Changes] : no hair changes [Skin Rash] : skin rash [Negative] : Heme/Lymph

## 2021-10-04 ENCOUNTER — RX RENEWAL (OUTPATIENT)
Age: 26
End: 2021-10-04

## 2021-11-04 ENCOUNTER — APPOINTMENT (OUTPATIENT)
Dept: PULMONOLOGY | Facility: CLINIC | Age: 26
End: 2021-11-04
Payer: MEDICAID

## 2021-11-04 VITALS
HEIGHT: 59 IN | WEIGHT: 143 LBS | HEART RATE: 79 BPM | DIASTOLIC BLOOD PRESSURE: 70 MMHG | BODY MASS INDEX: 28.83 KG/M2 | OXYGEN SATURATION: 98 % | TEMPERATURE: 97.3 F | SYSTOLIC BLOOD PRESSURE: 100 MMHG

## 2021-11-04 PROCEDURE — 99212 OFFICE O/P EST SF 10 MIN: CPT

## 2021-11-04 NOTE — REVIEW OF SYSTEMS
[Nasal Congestion] : no nasal congestion [Cough] : cough [Wheezing] : wheezing [SOB on Exertion] : no sob on exertion [Negative] : Endocrine

## 2021-11-04 NOTE — PHYSICAL EXAM
[No Acute Distress] : no acute distress [Normal Oropharynx] : normal oropharynx [Normal Appearance] : normal appearance [No Neck Mass] : no neck mass [Normal Rate/Rhythm] : normal rate/rhythm [Normal S1, S2] : normal s1, s2 [No Murmurs] : no murmurs [No Resp Distress] : no resp distress [Wheeze] : wheeze [No Abnormalities] : no abnormalities [Benign] : benign [Normal Gait] : normal gait [No Clubbing] : no clubbing [No Cyanosis] : no cyanosis [No Edema] : no edema [FROM] : FROM [Normal Color/ Pigmentation] : normal color/ pigmentation [No Focal Deficits] : no focal deficits [Oriented x3] : oriented x3 [Normal Affect] : normal affect [TextBox_68] : Moderate wheezing

## 2021-12-03 ENCOUNTER — APPOINTMENT (OUTPATIENT)
Dept: PULMONOLOGY | Facility: CLINIC | Age: 26
End: 2021-12-03
Payer: MEDICAID

## 2021-12-03 VITALS
SYSTOLIC BLOOD PRESSURE: 101 MMHG | HEART RATE: 90 BPM | TEMPERATURE: 98 F | HEIGHT: 59 IN | BODY MASS INDEX: 28.83 KG/M2 | WEIGHT: 143 LBS | OXYGEN SATURATION: 97 % | DIASTOLIC BLOOD PRESSURE: 72 MMHG

## 2021-12-03 PROCEDURE — 99213 OFFICE O/P EST LOW 20 MIN: CPT

## 2021-12-03 NOTE — ASSESSMENT
[FreeTextEntry1] : Patient has intermittent cough of unclear etiology.  I will get allergy consult to better evaluate possible allergic etiology

## 2021-12-08 ENCOUNTER — NON-APPOINTMENT (OUTPATIENT)
Age: 26
End: 2021-12-08

## 2021-12-08 ENCOUNTER — APPOINTMENT (OUTPATIENT)
Dept: FAMILY MEDICINE | Facility: CLINIC | Age: 26
End: 2021-12-08
Payer: MEDICAID

## 2021-12-08 VITALS
BODY MASS INDEX: 28.47 KG/M2 | SYSTOLIC BLOOD PRESSURE: 100 MMHG | WEIGHT: 145 LBS | OXYGEN SATURATION: 99 % | HEART RATE: 89 BPM | TEMPERATURE: 97.3 F | HEIGHT: 60 IN | RESPIRATION RATE: 15 BRPM | DIASTOLIC BLOOD PRESSURE: 72 MMHG

## 2021-12-08 DIAGNOSIS — R80.9 PROTEINURIA, UNSPECIFIED: ICD-10-CM

## 2021-12-08 DIAGNOSIS — Z92.29 PERSONAL HISTORY OF OTHER DRUG THERAPY: ICD-10-CM

## 2021-12-08 DIAGNOSIS — Z87.898 PERSONAL HISTORY OF OTHER SPECIFIED CONDITIONS: ICD-10-CM

## 2021-12-08 PROCEDURE — 99395 PREV VISIT EST AGE 18-39: CPT | Mod: 25

## 2021-12-08 PROCEDURE — G0008: CPT

## 2021-12-08 PROCEDURE — 90686 IIV4 VACC NO PRSV 0.5 ML IM: CPT

## 2021-12-08 PROCEDURE — 93000 ELECTROCARDIOGRAM COMPLETE: CPT | Mod: 59

## 2021-12-08 PROCEDURE — G0444 DEPRESSION SCREEN ANNUAL: CPT | Mod: 59

## 2021-12-08 NOTE — PHYSICAL EXAM
[No Acute Distress] : no acute distress [Well Nourished] : well nourished [Well Developed] : well developed [Well-Appearing] : well-appearing [Normal Sclera/Conjunctiva] : normal sclera/conjunctiva [PERRL] : pupils equal round and reactive to light [EOMI] : extraocular movements intact [Normal Outer Ear/Nose] : the outer ears and nose were normal in appearance [Normal Oropharynx] : the oropharynx was normal [No JVD] : no jugular venous distention [Supple] : supple [No Respiratory Distress] : no respiratory distress  [Clear to Auscultation] : lungs were clear to auscultation bilaterally [No Accessory Muscle Use] : no accessory muscle use [Normal Rate] : normal rate  [Regular Rhythm] : with a regular rhythm [Normal S1, S2] : normal S1 and S2 [No Murmur] : no murmur heard [No Carotid Bruits] : no carotid bruits [No Abdominal Bruit] : a ~M bruit was not heard ~T in the abdomen [No Varicosities] : no varicosities [Pedal Pulses Present] : the pedal pulses are present [No Edema] : there was no peripheral edema [No Extremity Clubbing/Cyanosis] : no extremity clubbing/cyanosis [No Palpable Aorta] : no palpable aorta [Soft] : abdomen soft [Non Tender] : non-tender [Non-distended] : non-distended [No Masses] : no abdominal mass palpated [No HSM] : no HSM [Normal Bowel Sounds] : normal bowel sounds [Normal Posterior Cervical Nodes] : no posterior cervical lymphadenopathy [Normal Anterior Cervical Nodes] : no anterior cervical lymphadenopathy [No CVA Tenderness] : no CVA  tenderness [No Spinal Tenderness] : no spinal tenderness [No Joint Swelling] : no joint swelling [Grossly Normal Strength/Tone] : grossly normal strength/tone [No Rash] : no rash [Normal Gait] : normal gait [Coordination Grossly Intact] : coordination grossly intact [No Focal Deficits] : no focal deficits [Deep Tendon Reflexes (DTR)] : deep tendon reflexes were 2+ and symmetric [Normal Affect] : the affect was normal [Alert and Oriented x3] : oriented to person, place, and time [Normal Insight/Judgement] : insight and judgment were intact [Declined Breast Exam] : declined breast exam  [de-identified] : done by GYN

## 2021-12-08 NOTE — HISTORY OF PRESENT ILLNESS
[FreeTextEntry1] : cc: physical ,FLu vacc  [de-identified] : Patient presented for physical. Patient denies CP,SOB,Abd pain, no N,V,C,D.Pt was seen by the Pulmon, was referred to see Allergist

## 2021-12-08 NOTE — COUNSELING
[Benefits of weight loss discussed] : Benefits of weight loss discussed [Encouraged to maintain food diary] : Encouraged to maintain food diary [Encouraged to increase physical activity] : Encouraged to increase physical activity [Encouraged to use exercise tracking device] : Encouraged to use exercise tracking device [Weigh Self Weekly] : weigh self weekly [____ min/wk Activity] : [unfilled] min/wk activity [Keep Food Diary] : keep food diary [Activity counseling provided] : activity [Decrease Portions] : Decrease food portions [Walking] : Walking [Good understanding] : Patient has a good understanding of lifestyle changes and the steps needed to achieve self management goals

## 2021-12-08 NOTE — HEALTH RISK ASSESSMENT
[Very Good] : ~his/her~  mood as very good [1 or 2 (0 pts)] : 1 or 2 (0 points) [Never (0 pts)] : Never (0 points) [No] : In the past 12 months have you used drugs other than those required for medical reasons? No [0] : 2) Feeling down, depressed, or hopeless: Not at all (0) [PHQ-2 Negative - No further assessment needed] : PHQ-2 Negative - No further assessment needed [Patient reported PAP Smear was normal] : Patient reported PAP Smear was normal [None] : None [With Family] : lives with family [Unemployed] : unemployed [High School] : high school [Single] : single [Feels Safe at Home] : Feels safe at home [Smoke Detector] : smoke detector [Carbon Monoxide Detector] : carbon monoxide detector [Seat Belt] :  uses seat belt [Sunscreen] : uses sunscreen [With Patient/Caregiver] : , with patient/caregiver [Discussed at today's visit] : Advance Directives Discussed at today's visit [Aggressive treatment] : aggressive treatment [FreeTextEntry1] :  cough  [] : No [de-identified] : No [de-identified] : no [Audit-CScore] : 0 [de-identified] : none [de-identified] : regular  [IOQ9Siixq] : 0 [Change in mental status noted] : No change in mental status noted [Language] : denies difficulty with language [Behavior] : denies difficulty with behavior [Reasoning] : denies difficulty with reasoning [Sexually Active] : not sexually active [Reports changes in hearing] : Reports no changes in hearing [Reports changes in vision] : Reports no changes in vision [Reports changes in dental health] : Reports no changes in dental health [MammogramDate] : never [MammogramComments] : NA [PapSmearDate] : 11/17 [BoneDensityDate] : never [BoneDensityComments] : NA [ColonoscopyDate] : never [ColonoscopyComments] : NA [HIVDate] : 12/20 [HepatitisCDate] : 12/20 [AdvancecareDate] : 12/21

## 2021-12-09 LAB
25(OH)D3 SERPL-MCNC: 34.2 NG/ML
ALBUMIN SERPL ELPH-MCNC: 4.7 G/DL
ALP BLD-CCNC: 74 U/L
ALT SERPL-CCNC: 18 U/L
ANION GAP SERPL CALC-SCNC: 12 MMOL/L
APPEARANCE: CLEAR
AST SERPL-CCNC: 14 U/L
BASOPHILS # BLD AUTO: 0.06 K/UL
BASOPHILS NFR BLD AUTO: 1.2 %
BILIRUB SERPL-MCNC: 0.6 MG/DL
BILIRUBIN URINE: NEGATIVE
BLOOD URINE: NORMAL
BUN SERPL-MCNC: 9 MG/DL
CALCIUM SERPL-MCNC: 9.3 MG/DL
CHLORIDE SERPL-SCNC: 105 MMOL/L
CHOLEST SERPL-MCNC: 224 MG/DL
CO2 SERPL-SCNC: 24 MMOL/L
COLOR: YELLOW
CREAT SERPL-MCNC: 0.8 MG/DL
EOSINOPHIL # BLD AUTO: 0.11 K/UL
EOSINOPHIL NFR BLD AUTO: 2.2 %
GLUCOSE QUALITATIVE U: NEGATIVE
GLUCOSE SERPL-MCNC: 93 MG/DL
HCT VFR BLD CALC: 41.7 %
HDLC SERPL-MCNC: 49 MG/DL
HGB BLD-MCNC: 13.4 G/DL
IMM GRANULOCYTES NFR BLD AUTO: 0.6 %
KETONES URINE: NEGATIVE
LDLC SERPL CALC-MCNC: 157 MG/DL
LEUKOCYTE ESTERASE URINE: NEGATIVE
LYMPHOCYTES # BLD AUTO: 1.36 K/UL
LYMPHOCYTES NFR BLD AUTO: 26.6 %
MAN DIFF?: NORMAL
MCHC RBC-ENTMCNC: 29.6 PG
MCHC RBC-ENTMCNC: 32.1 GM/DL
MCV RBC AUTO: 92.3 FL
MONOCYTES # BLD AUTO: 0.34 K/UL
MONOCYTES NFR BLD AUTO: 6.7 %
NEUTROPHILS # BLD AUTO: 3.21 K/UL
NEUTROPHILS NFR BLD AUTO: 62.7 %
NITRITE URINE: NEGATIVE
NONHDLC SERPL-MCNC: 175 MG/DL
PH URINE: 7
PLATELET # BLD AUTO: 340 K/UL
POTASSIUM SERPL-SCNC: 4 MMOL/L
PROT SERPL-MCNC: 7.4 G/DL
PROTEIN URINE: NEGATIVE
RBC # BLD: 4.52 M/UL
RBC # FLD: 12.6 %
SODIUM SERPL-SCNC: 141 MMOL/L
SPECIFIC GRAVITY URINE: 1.02
TRIGL SERPL-MCNC: 88 MG/DL
TSH SERPL-ACNC: 1.66 UIU/ML
UROBILINOGEN URINE: NORMAL
WBC # FLD AUTO: 5.11 K/UL

## 2022-01-03 ENCOUNTER — APPOINTMENT (OUTPATIENT)
Dept: PEDIATRIC ALLERGY IMMUNOLOGY | Facility: CLINIC | Age: 27
End: 2022-01-03
Payer: MEDICAID

## 2022-01-03 VITALS
HEIGHT: 55 IN | WEIGHT: 145 LBS | SYSTOLIC BLOOD PRESSURE: 108 MMHG | OXYGEN SATURATION: 99 % | BODY MASS INDEX: 33.56 KG/M2 | DIASTOLIC BLOOD PRESSURE: 76 MMHG | TEMPERATURE: 97.1 F | HEART RATE: 94 BPM

## 2022-01-03 DIAGNOSIS — R09.82 POSTNASAL DRIP: ICD-10-CM

## 2022-01-03 PROCEDURE — 99203 OFFICE O/P NEW LOW 30 MIN: CPT

## 2022-01-03 PROCEDURE — 99213 OFFICE O/P EST LOW 20 MIN: CPT

## 2022-01-03 RX ORDER — INHALER,ASSIST DEVICE,MED MASK
SPACER (EA) MISCELLANEOUS
Qty: 1 | Refills: 0 | Status: ACTIVE | COMMUNITY
Start: 2022-01-03 | End: 1900-01-01

## 2022-01-03 RX ORDER — ALBUTEROL SULFATE 90 UG/1
108 (90 BASE) INHALANT RESPIRATORY (INHALATION)
Qty: 1 | Refills: 6 | Status: ACTIVE | COMMUNITY
Start: 2020-11-24 | End: 1900-01-01

## 2022-01-03 NOTE — PHYSICAL EXAM
[Alert] : alert [Well Nourished] : well nourished [Healthy Appearance] : healthy appearance [No Acute Distress] : no acute distress [Well Developed] : well developed [No Discharge] : no discharge [Conjunctival Erythema] : no conjunctival erythema [Suborbital Bogginess] : no suborbital bogginess (allergic shiners) [Normal Nasal Mucosa] : the nasal mucosa was normal [Normal Lips/Tongue] : the lips and tongue were normal [Normal Outer Ear/Nose] : the ears and nose were normal in appearance [No Nasal Discharge] : no nasal discharge [No Thrush] : no thrush [Pale mucosa] : no pale mucosa [Boggy Nasal Turbinates] : no boggy and/or pale nasal turbinates [Posterior Pharyngeal Cobblestoning] : posterior pharyngeal cobblestoning [Supple] : the neck was supple [Normal Rate and Effort] : normal respiratory rhythm and effort [No Crackles] : no crackles [No Retractions] : no retractions [Bilateral Audible Breath Sounds] : bilateral audible breath sounds [Wheezing] : no wheezing was heard [Normal Rate] : heart rate was normal  [Normal S1, S2] : normal S1 and S2 [No murmur] : no murmur [Regular Rhythm] : with a regular rhythm [Not Distended] : not distended [Normal Cervical Lymph Nodes] : cervical [Skin Intact] : skin intact  [No Rash] : no rash [No Cyanosis] : no cyanosis [Normal Mood] : mood was normal [Normal Affect] : affect was normal [Alert, Awake, Oriented as Age-Appropriate] : alert, awake, oriented as age appropriate

## 2022-01-03 NOTE — HISTORY OF PRESENT ILLNESS
[de-identified] : 26-year-old female with asthma presenting for evaluation of nasal congestion, cough. Has year round symptoms, haven't noticed a season that's worse. No pets at home. 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 Zyrtec helps. Follows with Dr Snyder of pulmonary for asthma care. On Qvar 80 2 puffs BID. Doesn't use a spacer with rescue inhaler. No systemic steroid use. Denies nighttime cough initially. Never been allergy tested before. \par

## 2022-01-03 NOTE — DATA REVIEWED
[No studies available for review at this time.] : No studies available for review at this time. [FreeTextEntry1] : Aeroallergen panel all negative and total IgE normal in May 2021\par \par Normal PFT in June 2021\par

## 2022-01-03 NOTE — CONSULT LETTER
[Dear  ___] : Dear  [unfilled], [Consult Letter:] : I had the pleasure of evaluating your patient, [unfilled]. [Please see my note below.] : Please see my note below. [Consult Closing:] : Thank you very much for allowing me to participate in the care of this patient.  If you have any questions, please do not hesitate to contact me. [Sincerely,] : Sincerely, [FreeTextEntry3] : Emilia Richmond MD\par Attending, Division of Allergy and Immunology\par [facility]  [DrPetty  ___] : Dr. OSEI

## 2022-01-03 NOTE — SOCIAL HISTORY
[House] : [unfilled] lives in a house  [Radiator/Baseboard] : heating provided by radiator(s)/baseboard(s) [Window Units] : air conditioning provided by window units [Humidifier] : does not use a humidifier [Dehumidifier] : does not use a dehumidifier [Cockroaches] : Patient states that there are no cockroaches in the home [Dust Mite Covers] : does not have dust mite covers [Bedroom] : not in the bedroom [Living Area] : not in the living area [None] : none [Smokers in Household] : there are no smokers in the home

## 2022-01-03 NOTE — REVIEW OF SYSTEMS
[Nasal Congestion] : nasal congestion [Post Nasal Drip] : post nasal drip [Cough] : cough [Nl] : Genitourinary

## 2022-01-10 ENCOUNTER — APPOINTMENT (OUTPATIENT)
Dept: PEDIATRIC ALLERGY IMMUNOLOGY | Facility: CLINIC | Age: 27
End: 2022-01-10
Payer: MEDICAID

## 2022-01-10 VITALS
TEMPERATURE: 98 F | SYSTOLIC BLOOD PRESSURE: 104 MMHG | BODY MASS INDEX: 33.56 KG/M2 | DIASTOLIC BLOOD PRESSURE: 66 MMHG | HEIGHT: 55 IN | WEIGHT: 145 LBS | HEART RATE: 78 BPM | OXYGEN SATURATION: 98 %

## 2022-01-10 DIAGNOSIS — R05.9 COUGH, UNSPECIFIED: ICD-10-CM

## 2022-01-10 DIAGNOSIS — J30.9 ALLERGIC RHINITIS, UNSPECIFIED: ICD-10-CM

## 2022-01-10 PROCEDURE — 95004 PERQ TESTS W/ALRGNC XTRCS: CPT

## 2022-01-10 PROCEDURE — 99214 OFFICE O/P EST MOD 30 MIN: CPT | Mod: 25

## 2022-01-10 RX ORDER — PREDNISONE 20 MG/1
20 TABLET ORAL
Qty: 5 | Refills: 0 | Status: COMPLETED | COMMUNITY
Start: 2021-11-04 | End: 2022-01-10

## 2022-01-10 NOTE — SOCIAL HISTORY
[House] : [unfilled] lives in a house  [Radiator/Baseboard] : heating provided by radiator(s)/baseboard(s) [Window Units] : air conditioning provided by window units [None] : none [Humidifier] : does not use a humidifier [Dehumidifier] : does not use a dehumidifier [Cockroaches] : Patient states that there are no cockroaches in the home [Dust Mite Covers] : does not have dust mite covers [Bedroom] : not in the bedroom [Living Area] : not in the living area [Smokers in Household] : there are no smokers in the home

## 2022-01-10 NOTE — HISTORY OF PRESENT ILLNESS
[de-identified] : 26-year-old female with asthma presenting for evaluation of nasal congestion, cough. \par \par Interim history: Discontinued cetirizine, continued to use Flonase. No significant worsening in symptoms. \par Of note, reports significant local swelling and itching after mosquito bites. \par \par History on presentation:\par Has year round symptoms, haven't noticed a season that's worse. No pets at home. 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 Zyrtec helps. Follows with Dr Snyder of pulmonary for asthma care. On Qvar 80 2 puffs BID. Doesn't use a spacer with rescue inhaler. No systemic steroid use. Denies nighttime cough initially. Never been allergy tested before. \par

## 2022-01-10 NOTE — PHYSICAL EXAM
[Alert] : alert [Well Nourished] : well nourished [Healthy Appearance] : healthy appearance [No Acute Distress] : no acute distress [Well Developed] : well developed [No Discharge] : no discharge [Normal Nasal Mucosa] : the nasal mucosa was normal [Normal Lips/Tongue] : the lips and tongue were normal [Normal Outer Ear/Nose] : the ears and nose were normal in appearance [No Nasal Discharge] : no nasal discharge [No Thrush] : no thrush [Posterior Pharyngeal Cobblestoning] : posterior pharyngeal cobblestoning [Supple] : the neck was supple [Normal Rate and Effort] : normal respiratory rhythm and effort [No Crackles] : no crackles [No Retractions] : no retractions [Bilateral Audible Breath Sounds] : bilateral audible breath sounds [Normal Rate] : heart rate was normal  [Normal S1, S2] : normal S1 and S2 [No murmur] : no murmur [Regular Rhythm] : with a regular rhythm [Not Distended] : not distended [Normal Cervical Lymph Nodes] : cervical [Skin Intact] : skin intact  [No Rash] : no rash [No Cyanosis] : no cyanosis [Normal Mood] : mood was normal [Normal Affect] : affect was normal [Alert, Awake, Oriented as Age-Appropriate] : alert, awake, oriented as age appropriate [Conjunctival Erythema] : no conjunctival erythema [Suborbital Bogginess] : no suborbital bogginess (allergic shiners) [Pale mucosa] : no pale mucosa [Boggy Nasal Turbinates] : no boggy and/or pale nasal turbinates [Wheezing] : no wheezing was heard

## 2022-01-10 NOTE — CONSULT LETTER
[Dear  ___] : Dear  [unfilled], [Please see my note below.] : Please see my note below. [Consult Closing:] : Thank you very much for allowing me to participate in the care of this patient.  If you have any questions, please do not hesitate to contact me. [Sincerely,] : Sincerely, [DrPetty  ___] : Dr. OSEI [Courtesy Letter:] : I had the pleasure of seeing your patient, [unfilled], in my office today. [FreeTextEntry3] : Emilia Richmond MD\par Attending, Division of Allergy and Immunology\par [facility]

## 2022-01-11 ENCOUNTER — OUTPATIENT (OUTPATIENT)
Dept: OUTPATIENT SERVICES | Facility: HOSPITAL | Age: 27
LOS: 1 days | End: 2022-01-11
Payer: MEDICAID

## 2022-01-11 DIAGNOSIS — Z20.828 CONTACT WITH AND (SUSPECTED) EXPOSURE TO OTHER VIRAL COMMUNICABLE DISEASES: ICD-10-CM
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
## 2022-01-11 PROCEDURE — U0005: CPT

## 2022-01-11 PROCEDURE — U0003: CPT

## 2022-01-12 LAB — SARS-COV-2 RNA SPEC QL NAA+PROBE: SIGNIFICANT CHANGE UP

## 2022-01-13 ENCOUNTER — APPOINTMENT (OUTPATIENT)
Dept: OBGYN | Facility: CLINIC | Age: 27
End: 2022-01-13
Payer: MEDICAID

## 2022-01-13 VITALS
OXYGEN SATURATION: 98 % | WEIGHT: 142 LBS | HEART RATE: 96 BPM | TEMPERATURE: 98.2 F | HEIGHT: 55 IN | DIASTOLIC BLOOD PRESSURE: 70 MMHG | BODY MASS INDEX: 32.86 KG/M2 | SYSTOLIC BLOOD PRESSURE: 110 MMHG

## 2022-01-13 DIAGNOSIS — N92.0 EXCESSIVE AND FREQUENT MENSTRUATION WITH REGULAR CYCLE: ICD-10-CM

## 2022-01-13 PROCEDURE — 99214 OFFICE O/P EST MOD 30 MIN: CPT

## 2022-01-13 NOTE — PHYSICAL EXAM
[Chaperone Present] : A chaperone was present in the examining room during all aspects of the physical examination [Appropriately responsive] : appropriately responsive [Alert] : alert [No Acute Distress] : no acute distress [No Murmurs] : no murmurs [Oriented x3] : oriented x3

## 2022-01-13 NOTE — PLAN
[FreeTextEntry1] : fu 3 mo for med management\par \par I spent the time noted on the day of this patient encounter preparing for, providing and documenting the above E/M service and counseling and educate patient on differential, workup, disease course, and treatment/management. Education was provided to the patient during this encounter. All questions and concerns were answered and addressed in detail.\par \par Carla Mccullough MD\par

## 2022-01-13 NOTE — HISTORY OF PRESENT ILLNESS
[Patient reported PAP Smear was normal] : Patient reported PAP Smear was normal [FreeTextEntry1] : 27 yo P0 with LMP current presents today for discussion of dysmenorrhea and HMB with regular cycle. Pt previously seen by Dr. Resendiz for annual exam in 2021. Mother Denise Tay is a patient of mine as well. \par \par POB: denies\par PGYN: reg, heavy, dysmenorrhea, virginal, NILM pap in 2021\par PMH: Asthma\par PSH: myringotomy\par Med: per MAR\par All: NKMA\par SH: denies\par  [PapSmeardate] : 2021

## 2022-03-03 ENCOUNTER — APPOINTMENT (OUTPATIENT)
Dept: PULMONOLOGY | Facility: CLINIC | Age: 27
End: 2022-03-03
Payer: MEDICAID

## 2022-03-03 VITALS
WEIGHT: 142 LBS | DIASTOLIC BLOOD PRESSURE: 72 MMHG | OXYGEN SATURATION: 98 % | TEMPERATURE: 98.3 F | HEART RATE: 77 BPM | SYSTOLIC BLOOD PRESSURE: 110 MMHG | BODY MASS INDEX: 27.88 KG/M2 | HEIGHT: 60 IN

## 2022-03-03 PROCEDURE — 99213 OFFICE O/P EST LOW 20 MIN: CPT

## 2022-03-03 NOTE — PHYSICAL EXAM
[No Acute Distress] : no acute distress [Normal Oropharynx] : normal oropharynx [Normal Appearance] : normal appearance [No Neck Mass] : no neck mass [Normal Rate/Rhythm] : normal rate/rhythm [Normal S1, S2] : normal s1, s2 [No Murmurs] : no murmurs [No Resp Distress] : no resp distress [Clear to Auscultation Bilaterally] : clear to auscultation bilaterally [Wheeze] : wheeze [No Abnormalities] : no abnormalities [Benign] : benign [Normal Gait] : normal gait [No Clubbing] : no clubbing [No Cyanosis] : no cyanosis [No Edema] : no edema [FROM] : FROM [Normal Color/ Pigmentation] : normal color/ pigmentation [No Focal Deficits] : no focal deficits [Oriented x3] : oriented x3 [Normal Affect] : normal affect [TextBox_68] : Moderate wheezing resolved

## 2022-03-03 NOTE — REVIEW OF SYSTEMS
[Nasal Congestion] : no nasal congestion [Cough] : no cough [Wheezing] : no wheezing [SOB on Exertion] : no sob on exertion [Negative] : Endocrine

## 2022-03-17 ENCOUNTER — RX RENEWAL (OUTPATIENT)
Age: 27
End: 2022-03-17

## 2022-04-14 ENCOUNTER — APPOINTMENT (OUTPATIENT)
Dept: OBGYN | Facility: CLINIC | Age: 27
End: 2022-04-14
Payer: MEDICAID

## 2022-04-14 VITALS
TEMPERATURE: 98 F | OXYGEN SATURATION: 98 % | BODY MASS INDEX: 27.88 KG/M2 | HEART RATE: 100 BPM | WEIGHT: 142 LBS | SYSTOLIC BLOOD PRESSURE: 110 MMHG | HEIGHT: 60 IN | DIASTOLIC BLOOD PRESSURE: 70 MMHG

## 2022-04-14 DIAGNOSIS — Z30.09 ENCOUNTER FOR OTHER GENERAL COUNSELING AND ADVICE ON CONTRACEPTION: ICD-10-CM

## 2022-04-14 LAB
HCG UR QL: NEGATIVE
QUALITY CONTROL: YES

## 2022-04-14 PROCEDURE — 99213 OFFICE O/P EST LOW 20 MIN: CPT

## 2022-04-14 NOTE — PLAN
[FreeTextEntry1] : \par \par I spent the time noted on the day of this patient encounter preparing for, providing and documenting the above E/M service and counseling and educate patient on differential, workup, disease course, and treatment/management. Education was provided to the patient during this encounter. All questions and concerns were answered and addressed in detail.\par \par Carla Mccullough MD\par

## 2022-04-14 NOTE — HISTORY OF PRESENT ILLNESS
[FreeTextEntry1] : 25 yo with history of HMB/Dysmenorrhea presents today for med review and management. Has not had menses while on OCP--happy with it. Denies complaints

## 2022-06-13 ENCOUNTER — APPOINTMENT (OUTPATIENT)
Dept: OPHTHALMOLOGY | Facility: CLINIC | Age: 27
End: 2022-06-13
Payer: MEDICAID

## 2022-06-13 ENCOUNTER — NON-APPOINTMENT (OUTPATIENT)
Age: 27
End: 2022-06-13

## 2022-06-13 PROCEDURE — 92004 COMPRE OPH EXAM NEW PT 1/>: CPT

## 2022-06-24 ENCOUNTER — NON-APPOINTMENT (OUTPATIENT)
Age: 27
End: 2022-06-24

## 2022-07-18 ENCOUNTER — TRANSCRIPTION ENCOUNTER (OUTPATIENT)
Age: 27
End: 2022-07-18

## 2022-07-18 ENCOUNTER — RX RENEWAL (OUTPATIENT)
Age: 27
End: 2022-07-18

## 2022-07-20 ENCOUNTER — RX RENEWAL (OUTPATIENT)
Age: 27
End: 2022-07-20

## 2022-08-24 ENCOUNTER — APPOINTMENT (OUTPATIENT)
Dept: FAMILY MEDICINE | Facility: CLINIC | Age: 27
End: 2022-08-24

## 2022-08-24 RX ORDER — CLOTRIMAZOLE AND BETAMETHASONE DIPROPIONATE 10; .5 MG/G; MG/G
1-0.05 CREAM TOPICAL TWICE DAILY
Qty: 1 | Refills: 0 | Status: ACTIVE | COMMUNITY
Start: 2021-08-23 | End: 1900-01-01

## 2022-09-16 ENCOUNTER — APPOINTMENT (OUTPATIENT)
Dept: PULMONOLOGY | Facility: CLINIC | Age: 27
End: 2022-09-16

## 2022-09-16 VITALS
BODY MASS INDEX: 27.88 KG/M2 | OXYGEN SATURATION: 99 % | TEMPERATURE: 98.4 F | HEIGHT: 60 IN | SYSTOLIC BLOOD PRESSURE: 100 MMHG | DIASTOLIC BLOOD PRESSURE: 60 MMHG | WEIGHT: 142 LBS | HEART RATE: 83 BPM

## 2022-09-16 PROCEDURE — 99212 OFFICE O/P EST SF 10 MIN: CPT

## 2022-09-16 NOTE — PHYSICAL EXAM
[No Acute Distress] : no acute distress [Normal Oropharynx] : normal oropharynx [Normal Appearance] : normal appearance [No Neck Mass] : no neck mass [Normal Rate/Rhythm] : normal rate/rhythm [Normal S1, S2] : normal s1, s2 [No Murmurs] : no murmurs [No Resp Distress] : no resp distress [Clear to Auscultation Bilaterally] : clear to auscultation bilaterally [Wheeze] : wheeze [No Abnormalities] : no abnormalities [Normal Gait] : normal gait [No Clubbing] : no clubbing [No Cyanosis] : no cyanosis [No Edema] : no edema [FROM] : FROM [Normal Color/ Pigmentation] : normal color/ pigmentation [No Focal Deficits] : no focal deficits [Oriented x3] : oriented x3 [Normal Affect] : normal affect [TextBox_68] : Moderate wheezing resolved

## 2022-09-16 NOTE — REASON FOR VISIT
[Follow-Up] : a follow-up visit [Asthma] : asthma [Parent] : parent [TextBox_44] : Allergic rhinitis

## 2022-09-19 ENCOUNTER — APPOINTMENT (OUTPATIENT)
Dept: OPHTHALMOLOGY | Facility: CLINIC | Age: 27
End: 2022-09-19

## 2022-09-19 ENCOUNTER — NON-APPOINTMENT (OUTPATIENT)
Age: 27
End: 2022-09-19

## 2022-09-19 PROCEDURE — 92012 INTRM OPH EXAM EST PATIENT: CPT

## 2022-09-19 PROCEDURE — 92133 CPTRZD OPH DX IMG PST SGM ON: CPT

## 2022-09-19 PROCEDURE — 92285 EXTERNAL OCULAR PHOTOGRAPHY: CPT

## 2022-09-21 ENCOUNTER — APPOINTMENT (OUTPATIENT)
Dept: FAMILY MEDICINE | Facility: CLINIC | Age: 27
End: 2022-09-21

## 2022-09-21 VITALS
TEMPERATURE: 97.3 F | BODY MASS INDEX: 27.09 KG/M2 | HEART RATE: 113 BPM | HEIGHT: 60 IN | DIASTOLIC BLOOD PRESSURE: 80 MMHG | SYSTOLIC BLOOD PRESSURE: 114 MMHG | OXYGEN SATURATION: 97 % | RESPIRATION RATE: 18 BRPM | WEIGHT: 138 LBS

## 2022-09-21 PROCEDURE — 99214 OFFICE O/P EST MOD 30 MIN: CPT | Mod: 25

## 2022-09-21 PROCEDURE — 90686 IIV4 VACC NO PRSV 0.5 ML IM: CPT

## 2022-09-21 PROCEDURE — G0008: CPT

## 2022-09-21 NOTE — REVIEW OF SYSTEMS
[Negative] : Integumentary [Shortness Of Breath] : no shortness of breath [Wheezing] : no wheezing [Cough] : no cough [Dyspnea on Exertion] : no dyspnea on exertion

## 2022-09-21 NOTE — PHYSICAL EXAM
[Alert and Oriented x3] : oriented to person, place, and time [Normal] : affect was normal and insight and judgment were intact [No Acute Distress] : no acute distress

## 2022-09-21 NOTE — HISTORY OF PRESENT ILLNESS
[FreeTextEntry8] : Patient presented with her mother to f/u on her Asthma. Pt . Patient denies CP,SOB,Abd pain Needs a FLu vacc and blood workr

## 2022-09-21 NOTE — HEALTH RISK ASSESSMENT
[No] : In the past 12 months have you used drugs other than those required for medical reasons? No [Never] : Never [No falls in past year] : Patient reported no falls in the past year [0] : 2) Feeling down, depressed, or hopeless: Not at all (0) [PHQ-2 Negative - No further assessment needed] : PHQ-2 Negative - No further assessment needed [Audit-CScore] : 0 [de-identified] : walking  [de-identified] : less snacks  [[id number]] : 0

## 2022-09-28 LAB
ALBUMIN SERPL ELPH-MCNC: 4.8 G/DL
ALP BLD-CCNC: 58 U/L
ALT SERPL-CCNC: 14 U/L
ANION GAP SERPL CALC-SCNC: 14 MMOL/L
APPEARANCE: CLEAR
AST SERPL-CCNC: 15 U/L
BASOPHILS # BLD AUTO: 0.05 K/UL
BASOPHILS NFR BLD AUTO: 0.9 %
BILIRUB SERPL-MCNC: 0.7 MG/DL
BILIRUBIN URINE: NEGATIVE
BLOOD URINE: NEGATIVE
BUN SERPL-MCNC: 10 MG/DL
CALCIUM SERPL-MCNC: 9.5 MG/DL
CHLORIDE SERPL-SCNC: 104 MMOL/L
CHOLEST SERPL-MCNC: 232 MG/DL
CO2 SERPL-SCNC: 19 MMOL/L
COLOR: YELLOW
CREAT SERPL-MCNC: 0.71 MG/DL
EGFR: 119 ML/MIN/1.73M2
EOSINOPHIL # BLD AUTO: 0.04 K/UL
EOSINOPHIL NFR BLD AUTO: 0.7 %
GLUCOSE QUALITATIVE U: NEGATIVE
GLUCOSE SERPL-MCNC: 88 MG/DL
HCT VFR BLD CALC: 39.8 %
HDLC SERPL-MCNC: 40 MG/DL
HGB BLD-MCNC: 13.5 G/DL
IMM GRANULOCYTES NFR BLD AUTO: 0.5 %
KETONES URINE: NEGATIVE
LDLC SERPL CALC-MCNC: 159 MG/DL
LEUKOCYTE ESTERASE URINE: NEGATIVE
LYMPHOCYTES # BLD AUTO: 1.4 K/UL
LYMPHOCYTES NFR BLD AUTO: 25.2 %
MAN DIFF?: NORMAL
MCHC RBC-ENTMCNC: 29.9 PG
MCHC RBC-ENTMCNC: 33.9 GM/DL
MCV RBC AUTO: 88.1 FL
MONOCYTES # BLD AUTO: 0.31 K/UL
MONOCYTES NFR BLD AUTO: 5.6 %
NEUTROPHILS # BLD AUTO: 3.73 K/UL
NEUTROPHILS NFR BLD AUTO: 67.1 %
NITRITE URINE: NEGATIVE
NONHDLC SERPL-MCNC: 192 MG/DL
PH URINE: 7
PLATELET # BLD AUTO: 331 K/UL
POTASSIUM SERPL-SCNC: 4.3 MMOL/L
PROT SERPL-MCNC: 6.8 G/DL
PROTEIN URINE: NORMAL
PSA SERPL-MCNC: <0.01 NG/ML
RBC # BLD: 4.52 M/UL
RBC # FLD: 12.8 %
SODIUM SERPL-SCNC: 137 MMOL/L
SPECIFIC GRAVITY URINE: 1.02
TRIGL SERPL-MCNC: 164 MG/DL
TSH SERPL-ACNC: 0.85 UIU/ML
UROBILINOGEN URINE: NORMAL
WBC # FLD AUTO: 5.56 K/UL

## 2022-10-27 ENCOUNTER — NON-APPOINTMENT (OUTPATIENT)
Age: 27
End: 2022-10-27

## 2022-11-28 ENCOUNTER — NON-APPOINTMENT (OUTPATIENT)
Age: 27
End: 2022-11-28

## 2022-12-16 ENCOUNTER — APPOINTMENT (OUTPATIENT)
Dept: OPHTHALMOLOGY | Facility: CLINIC | Age: 27
End: 2022-12-16

## 2022-12-16 ENCOUNTER — NON-APPOINTMENT (OUTPATIENT)
Age: 27
End: 2022-12-16

## 2022-12-16 PROCEDURE — 92014 COMPRE OPH EXAM EST PT 1/>: CPT

## 2022-12-16 PROCEDURE — 76514 ECHO EXAM OF EYE THICKNESS: CPT

## 2022-12-16 PROCEDURE — 92020 GONIOSCOPY: CPT

## 2023-02-08 ENCOUNTER — LABORATORY RESULT (OUTPATIENT)
Age: 28
End: 2023-02-08

## 2023-02-08 ENCOUNTER — NON-APPOINTMENT (OUTPATIENT)
Age: 28
End: 2023-02-08

## 2023-02-08 ENCOUNTER — APPOINTMENT (OUTPATIENT)
Dept: FAMILY MEDICINE | Facility: CLINIC | Age: 28
End: 2023-02-08
Payer: MEDICAID

## 2023-02-08 VITALS
HEIGHT: 59 IN | TEMPERATURE: 97.4 F | WEIGHT: 140 LBS | RESPIRATION RATE: 17 BRPM | HEART RATE: 116 BPM | SYSTOLIC BLOOD PRESSURE: 114 MMHG | OXYGEN SATURATION: 98 % | BODY MASS INDEX: 28.22 KG/M2 | DIASTOLIC BLOOD PRESSURE: 66 MMHG

## 2023-02-08 PROCEDURE — 99395 PREV VISIT EST AGE 18-39: CPT | Mod: 25

## 2023-02-08 PROCEDURE — 93000 ELECTROCARDIOGRAM COMPLETE: CPT | Mod: 59

## 2023-02-08 PROCEDURE — G0444 DEPRESSION SCREEN ANNUAL: CPT | Mod: 59

## 2023-02-08 NOTE — HISTORY OF PRESENT ILLNESS
[FreeTextEntry1] : cc: physical [de-identified] : Patient presented for  her physical. Patient denies CP,SOB,Abd pain, no N,V,C,D. Epistaxis resolved.

## 2023-02-08 NOTE — HEALTH RISK ASSESSMENT
[Very Good] : ~his/her~  mood as very good [1 or 2 (0 pts)] : 1 or 2 (0 points) [Never (0 pts)] : Never (0 points) [No] : In the past 12 months have you used drugs other than those required for medical reasons? No [0] : 2) Feeling down, depressed, or hopeless: Not at all (0) [PHQ-2 Negative - No further assessment needed] : PHQ-2 Negative - No further assessment needed [Patient reported PAP Smear was normal] : Patient reported PAP Smear was normal [None] : None [With Family] : lives with family [Unemployed] : unemployed [High School] : high school [Single] : single [Feels Safe at Home] : Feels safe at home [Smoke Detector] : smoke detector [Carbon Monoxide Detector] : carbon monoxide detector [Seat Belt] :  uses seat belt [Sunscreen] : uses sunscreen [With Patient/Caregiver] : , with patient/caregiver [Discussed at today's visit] : Advance Directives Discussed at today's visit [Aggressive treatment] : aggressive treatment [FreeTextEntry1] : none [de-identified] : No [de-identified] : Pulmon, GYN [Audit-CScore] : 0 [de-identified] : none [de-identified] : regular  [QBM1Qvgll] : 0 [Change in mental status noted] : No change in mental status noted [Language] : denies difficulty with language [Behavior] : denies difficulty with behavior [Reasoning] : denies difficulty with reasoning [Sexually Active] : not sexually active [Reports changes in hearing] : Reports no changes in hearing [Reports changes in vision] : Reports no changes in vision [Reports changes in dental health] : Reports no changes in dental health [MammogramDate] : never [MammogramComments] : NA [PapSmearDate] : 05/21 [BoneDensityDate] : never [BoneDensityComments] : NA [ColonoscopyDate] : never [ColonoscopyComments] : NA [HIVDate] : 12/20 [HepatitisCDate] : 12/20 [AdvancecareDate] : 02/23

## 2023-02-20 LAB
ALBUMIN SERPL ELPH-MCNC: 4.2 G/DL
ALP BLD-CCNC: 54 U/L
ALT SERPL-CCNC: 14 U/L
ANION GAP SERPL CALC-SCNC: 11 MMOL/L
APPEARANCE: CLEAR
AST SERPL-CCNC: 12 U/L
BASOPHILS # BLD AUTO: 0.05 K/UL
BASOPHILS NFR BLD AUTO: 0.9 %
BILIRUB SERPL-MCNC: 0.4 MG/DL
BILIRUBIN URINE: NEGATIVE
BLOOD URINE: NEGATIVE
BUN SERPL-MCNC: 10 MG/DL
CALCIUM SERPL-MCNC: 9 MG/DL
CHLORIDE SERPL-SCNC: 105 MMOL/L
CHOLEST SERPL-MCNC: 219 MG/DL
CO2 SERPL-SCNC: 21 MMOL/L
COLOR: YELLOW
CREAT SERPL-MCNC: 0.74 MG/DL
EGFR: 114 ML/MIN/1.73M2
EOSINOPHIL # BLD AUTO: 0.07 K/UL
EOSINOPHIL NFR BLD AUTO: 1.2 %
GLUCOSE QUALITATIVE U: NEGATIVE
GLUCOSE SERPL-MCNC: 94 MG/DL
HCT VFR BLD CALC: 38 %
HDLC SERPL-MCNC: 40 MG/DL
HGB BLD-MCNC: 12.5 G/DL
IMM GRANULOCYTES NFR BLD AUTO: 0.2 %
KETONES URINE: NEGATIVE
LDLC SERPL CALC-MCNC: 151 MG/DL
LEUKOCYTE ESTERASE URINE: NEGATIVE
LYMPHOCYTES # BLD AUTO: 1.57 K/UL
LYMPHOCYTES NFR BLD AUTO: 27.9 %
MAN DIFF?: NORMAL
MCHC RBC-ENTMCNC: 30.1 PG
MCHC RBC-ENTMCNC: 32.9 GM/DL
MCV RBC AUTO: 91.6 FL
MONOCYTES # BLD AUTO: 0.34 K/UL
MONOCYTES NFR BLD AUTO: 6 %
NEUTROPHILS # BLD AUTO: 3.59 K/UL
NEUTROPHILS NFR BLD AUTO: 63.8 %
NITRITE URINE: NEGATIVE
NONHDLC SERPL-MCNC: 179 MG/DL
PH URINE: 6
PLATELET # BLD AUTO: 311 K/UL
POTASSIUM SERPL-SCNC: 3.8 MMOL/L
PROT SERPL-MCNC: 6.7 G/DL
PROTEIN URINE: ABNORMAL
RBC # BLD: 4.15 M/UL
RBC # FLD: 12.7 %
SODIUM SERPL-SCNC: 138 MMOL/L
SPECIFIC GRAVITY URINE: 1.03
TRIGL SERPL-MCNC: 138 MG/DL
TSH SERPL-ACNC: 1.31 UIU/ML
UROBILINOGEN URINE: NORMAL
WBC # FLD AUTO: 5.63 K/UL

## 2023-04-02 ENCOUNTER — RX RENEWAL (OUTPATIENT)
Age: 28
End: 2023-04-02

## 2023-05-15 ENCOUNTER — TRANSCRIPTION ENCOUNTER (OUTPATIENT)
Age: 28
End: 2023-05-15

## 2023-05-16 ENCOUNTER — TRANSCRIPTION ENCOUNTER (OUTPATIENT)
Age: 28
End: 2023-05-16

## 2023-05-28 ENCOUNTER — NON-APPOINTMENT (OUTPATIENT)
Age: 28
End: 2023-05-28

## 2023-06-02 ENCOUNTER — TRANSCRIPTION ENCOUNTER (OUTPATIENT)
Age: 28
End: 2023-06-02

## 2023-06-05 ENCOUNTER — NON-APPOINTMENT (OUTPATIENT)
Age: 28
End: 2023-06-05

## 2023-06-15 ENCOUNTER — TRANSCRIPTION ENCOUNTER (OUTPATIENT)
Age: 28
End: 2023-06-15

## 2023-06-29 ENCOUNTER — APPOINTMENT (OUTPATIENT)
Dept: OBGYN | Facility: CLINIC | Age: 28
End: 2023-06-29
Payer: MEDICAID

## 2023-06-29 VITALS
SYSTOLIC BLOOD PRESSURE: 100 MMHG | HEIGHT: 59 IN | BODY MASS INDEX: 28.22 KG/M2 | DIASTOLIC BLOOD PRESSURE: 60 MMHG | WEIGHT: 140 LBS | HEART RATE: 88 BPM | TEMPERATURE: 96.9 F | OXYGEN SATURATION: 99 %

## 2023-06-29 DIAGNOSIS — Z01.419 ENCOUNTER FOR GYNECOLOGICAL EXAMINATION (GENERAL) (ROUTINE) W/OUT ABNORMAL FINDINGS: ICD-10-CM

## 2023-06-29 DIAGNOSIS — N76.0 ACUTE VAGINITIS: ICD-10-CM

## 2023-06-29 PROCEDURE — 99395 PREV VISIT EST AGE 18-39: CPT

## 2023-06-29 RX ORDER — ALPRAZOLAM 0.25 MG/1
0.25 TABLET ORAL
Qty: 2 | Refills: 0 | Status: ACTIVE | COMMUNITY
Start: 2023-06-29 | End: 1900-01-01

## 2023-06-30 LAB
HCG UR QL: NEGATIVE
QUALITY CONTROL: YES

## 2023-07-03 LAB
C TRACH RRNA SPEC QL NAA+PROBE: NOT DETECTED
CANDIDA VAG CYTO: NOT DETECTED
G VAGINALIS+PREV SP MTYP VAG QL MICRO: NOT DETECTED
N GONORRHOEA RRNA SPEC QL NAA+PROBE: NOT DETECTED
SOURCE AMPLIFICATION: NORMAL
T VAGINALIS VAG QL WET PREP: NOT DETECTED

## 2023-07-05 ENCOUNTER — NON-APPOINTMENT (OUTPATIENT)
Age: 28
End: 2023-07-05

## 2023-07-05 LAB — CYTOLOGY CVX/VAG DOC THIN PREP: NORMAL

## 2023-07-19 ENCOUNTER — APPOINTMENT (OUTPATIENT)
Dept: ULTRASOUND IMAGING | Facility: HOSPITAL | Age: 28
End: 2023-07-19
Payer: MEDICAID

## 2023-07-19 ENCOUNTER — OUTPATIENT (OUTPATIENT)
Dept: OUTPATIENT SERVICES | Facility: HOSPITAL | Age: 28
LOS: 1 days | End: 2023-07-19
Payer: MEDICAID

## 2023-07-19 ENCOUNTER — RESULT REVIEW (OUTPATIENT)
Age: 28
End: 2023-07-19

## 2023-07-19 DIAGNOSIS — N84.1 POLYP OF CERVIX UTERI: ICD-10-CM

## 2023-07-19 PROCEDURE — 76856 US EXAM PELVIC COMPLETE: CPT

## 2023-07-19 PROCEDURE — 76856 US EXAM PELVIC COMPLETE: CPT | Mod: 26

## 2023-08-01 ENCOUNTER — APPOINTMENT (OUTPATIENT)
Dept: FAMILY MEDICINE | Facility: CLINIC | Age: 28
End: 2023-08-01
Payer: MEDICAID

## 2023-08-01 VITALS
WEIGHT: 144 LBS | HEART RATE: 118 BPM | OXYGEN SATURATION: 98 % | BODY MASS INDEX: 29.03 KG/M2 | HEIGHT: 59 IN | RESPIRATION RATE: 15 BRPM | DIASTOLIC BLOOD PRESSURE: 74 MMHG | TEMPERATURE: 97.4 F | SYSTOLIC BLOOD PRESSURE: 110 MMHG

## 2023-08-01 DIAGNOSIS — J30.89 OTHER ALLERGIC RHINITIS: ICD-10-CM

## 2023-08-01 PROCEDURE — 99214 OFFICE O/P EST MOD 30 MIN: CPT | Mod: 25

## 2023-08-01 RX ORDER — CETIRIZINE HYDROCHLORIDE 10 MG/1
10 TABLET, COATED ORAL
Qty: 30 | Refills: 11 | Status: ACTIVE | COMMUNITY
Start: 2023-08-01 | End: 1900-01-01

## 2023-08-01 NOTE — PHYSICAL EXAM
[No Acute Distress] : no acute distress [Alert and Oriented x3] : oriented to person, place, and time [Normal] : no jugular venous distention, supple, no lymphadenopathy and the thyroid was normal and there were no nodules present

## 2023-08-01 NOTE — REVIEW OF SYSTEMS
[Recent Change In Weight] : ~T recent weight change [Wheezing] : no wheezing [Shortness Of Breath] : no shortness of breath [Cough] : no cough [Dyspnea on Exertion] : no dyspnea on exertion [Negative] : Neurological

## 2023-08-01 NOTE — HISTORY OF PRESENT ILLNESS
[FreeTextEntry1] : cc:weight , Asthma. lipids [de-identified] : Patient presented with her mother to f/u on her Asthma.lipids. . Patient vis doing well, She denies CP,SOB,Abd pain. She gained few lb. since last visit.

## 2023-08-01 NOTE — HEALTH RISK ASSESSMENT
[1 or 2 (0 pts)] : 1 or 2 (0 points) [Never (0 pts)] : Never (0 points) [No] : In the past 12 months have you used drugs other than those required for medical reasons? No [No falls in past year] : Patient reported no falls in the past year [0] : 2) Feeling down, depressed, or hopeless: Not at all (0) [PHQ-2 Negative - No further assessment needed] : PHQ-2 Negative - No further assessment needed [de-identified] : Pulmon  [Audit-CScore] : 0 [de-identified] : none [de-identified] : regular  [Never] : Never [[id number]] : 0

## 2023-08-02 ENCOUNTER — APPOINTMENT (OUTPATIENT)
Dept: OBGYN | Facility: CLINIC | Age: 28
End: 2023-08-02
Payer: MEDICAID

## 2023-08-02 VITALS
SYSTOLIC BLOOD PRESSURE: 114 MMHG | TEMPERATURE: 97.8 F | HEART RATE: 103 BPM | HEIGHT: 59 IN | OXYGEN SATURATION: 98 % | WEIGHT: 144 LBS | DIASTOLIC BLOOD PRESSURE: 80 MMHG | BODY MASS INDEX: 29.03 KG/M2

## 2023-08-02 DIAGNOSIS — N84.1 POLYP OF CERVIX UTERI: ICD-10-CM

## 2023-08-02 PROCEDURE — 99212 OFFICE O/P EST SF 10 MIN: CPT

## 2023-08-02 NOTE — HISTORY OF PRESENT ILLNESS
[FreeTextEntry1] : Pelvic US reviewed, wnl  SSE today--still limited, likely cervical polyp of 2 cm noted  Recommend exam under anesthesia and polypectomy. Patient to consider

## 2023-08-02 NOTE — HISTORY OF PRESENT ILLNESS
[FreeTextEntry1] : Pt is a 28 year old presents today for well woman exam. \par LMP: 10/2022 on BC \par \par \par Polyp at cervical os?? exam extremely limited due to patient discomfort\par \par Rec pelvic sono and repeat exam

## 2023-08-02 NOTE — PLAN
[FreeTextEntry1] :   I spent the time noted on the day of this patient encounter preparing for, providing and documenting the above service. I have  counseled and educated the patient on the differential, workup, disease course, and treatment/management plan. Education was provided to the patient during this encounter. All questions and concerns were answered and addressed in detail. .mcammosix

## 2023-08-02 NOTE — PHYSICAL EXAM
[Appropriately responsive] : appropriately responsive [Alert] : alert [No Acute Distress] : no acute distress [No Lymphadenopathy] : no lymphadenopathy [Soft] : soft [Non-tender] : non-tender [Non-distended] : non-distended [No HSM] : No HSM [No Lesions] : no lesions [No Mass] : no mass [Oriented x3] : oriented x3 [Labia Majora] : normal [Labia Minora] : normal [Polyp ___ cm] : [unfilled] ~[facility] polyp [Normal] : normal [Uterine Adnexae] : normal

## 2023-08-02 NOTE — PHYSICAL EXAM
[Appropriately responsive] : appropriately responsive [Alert] : alert [No Acute Distress] : no acute distress [No Lymphadenopathy] : no lymphadenopathy [Soft] : soft [Non-tender] : non-tender [Non-distended] : non-distended [No HSM] : No HSM [No Lesions] : no lesions [No Mass] : no mass [Oriented x3] : oriented x3 [Examination Of The Breasts] : a normal appearance [No Masses] : no breast masses were palpable [Labia Majora] : normal [Labia Minora] : normal [Normal] : normal [FreeTextEntry5] : as above, limited

## 2023-08-02 NOTE — PLAN
[FreeTextEntry1] : We discussed the risks, benefits and alternatives of the procedure including, but not limited to: pain, bleeding, infection, risk of damage to the cervix, uterus, uterine perforation, chance of diagnostic laparoscopy or laparotomy, risk of damage of structures surrounding the uterus including but not limited to bowel, bladder ureters, nerves, and vessels. The chance of deep vein thromboses was also discussed. The patient expressed understanding of the risks, benefits and alternatives of the procedure and was able to explain them in her own words.  Carla Mccullough MD

## 2023-08-03 LAB
HCG UR QL: NEGATIVE
QUALITY CONTROL: YES

## 2023-08-06 LAB
ALBUMIN SERPL ELPH-MCNC: 4.6 G/DL
ALP BLD-CCNC: 52 U/L
ALT SERPL-CCNC: 19 U/L
ANION GAP SERPL CALC-SCNC: 13 MMOL/L
AST SERPL-CCNC: 14 U/L
BILIRUB SERPL-MCNC: 0.8 MG/DL
BUN SERPL-MCNC: 8 MG/DL
CALCIUM SERPL-MCNC: 9.4 MG/DL
CHLORIDE SERPL-SCNC: 103 MMOL/L
CHOLEST SERPL-MCNC: 243 MG/DL
CO2 SERPL-SCNC: 22 MMOL/L
CREAT SERPL-MCNC: 0.71 MG/DL
EGFR: 119 ML/MIN/1.73M2
GLUCOSE SERPL-MCNC: 93 MG/DL
HCT VFR BLD CALC: 41.9 %
HDLC SERPL-MCNC: 47 MG/DL
HGB BLD-MCNC: 13.8 G/DL
LDLC SERPL CALC-MCNC: 172 MG/DL
MCHC RBC-ENTMCNC: 30.1 PG
MCHC RBC-ENTMCNC: 32.9 GM/DL
MCV RBC AUTO: 91.3 FL
NONHDLC SERPL-MCNC: 196 MG/DL
PLATELET # BLD AUTO: 341 K/UL
POTASSIUM SERPL-SCNC: 4.2 MMOL/L
PROT SERPL-MCNC: 7.1 G/DL
RBC # BLD: 4.59 M/UL
RBC # FLD: 12.6 %
SODIUM SERPL-SCNC: 138 MMOL/L
TRIGL SERPL-MCNC: 132 MG/DL
WBC # FLD AUTO: 4.28 K/UL

## 2023-08-14 ENCOUNTER — OUTPATIENT (OUTPATIENT)
Dept: OUTPATIENT SERVICES | Facility: HOSPITAL | Age: 28
LOS: 1 days | End: 2023-08-14
Payer: MEDICAID

## 2023-08-14 VITALS
OXYGEN SATURATION: 100 % | WEIGHT: 145.06 LBS | HEART RATE: 98 BPM | DIASTOLIC BLOOD PRESSURE: 75 MMHG | SYSTOLIC BLOOD PRESSURE: 125 MMHG | TEMPERATURE: 99 F | HEIGHT: 59 IN | RESPIRATION RATE: 14 BRPM

## 2023-08-14 DIAGNOSIS — J45.909 UNSPECIFIED ASTHMA, UNCOMPLICATED: ICD-10-CM

## 2023-08-14 DIAGNOSIS — N84.1 POLYP OF CERVIX UTERI: ICD-10-CM

## 2023-08-14 DIAGNOSIS — Z01.818 ENCOUNTER FOR OTHER PREPROCEDURAL EXAMINATION: ICD-10-CM

## 2023-08-14 DIAGNOSIS — Z98.890 OTHER SPECIFIED POSTPROCEDURAL STATES: Chronic | ICD-10-CM

## 2023-08-14 DIAGNOSIS — Z90.89 ACQUIRED ABSENCE OF OTHER ORGANS: Chronic | ICD-10-CM

## 2023-08-14 PROCEDURE — G0463: CPT

## 2023-08-14 NOTE — H&P PST ADULT - PROBLEM SELECTOR PLAN 1
Pre-op instructions given. Pt verbalized understanding  Chlorhexidine wash instructions given  Pt instructed to hold all NSAIDs + herbal supplements/vitamins 7 days before surgery  UCG ordered STAT before surgery  Copies of lab result from PCP in chart

## 2023-08-14 NOTE — H&P PST ADULT - HISTORY OF PRESENT ILLNESS
29yo female with medical h/o Allergy-mediated Asthma, reports gyn eval indicate Polyp of cervix uteri, presents today for PST for scheduled Exam under anesthesia, possible cervical polypectomy on 8/17/2023

## 2023-08-14 NOTE — H&P PST ADULT - NSICDXPASTSURGICALHX_GEN_ALL_CORE_FT
PAST SURGICAL HISTORY:  H/O adenoidectomy     History of ear surgery     S/P middle ear reconstruction

## 2023-08-14 NOTE — H&P PST ADULT - BIRTH SEX
Body Mass Index: Care Instructions  Your Care Instructions    Body mass index (BMI) can help you see if your weight is raising your risk for health problems. It uses a formula to compare how much you weigh with how tall you are. · A BMI lower than 18.5 is considered underweight. · A BMI between 18.5 and 24.9 is considered healthy. · A BMI between 25 and 29.9 is considered overweight. A BMI of 30 or higher is considered obese. If your BMI is in the normal range, it means that you have a lower risk for weight-related health problems. If your BMI is in the overweight or obese range, you may be at increased risk for weight-related health problems, such as high blood pressure, heart disease, stroke, arthritis or joint pain, and diabetes. If your BMI is in the underweight range, you may be at increased risk for health problems such as fatigue, lower protection (immunity) against illness, muscle loss, bone loss, hair loss, and hormone problems. BMI is just one measure of your risk for weight-related health problems. You may be at higher risk for health problems if you are not active, you eat an unhealthy diet, or you drink too much alcohol or use tobacco products. Follow-up care is a key part of your treatment and safety. Be sure to make and go to all appointments, and call your doctor if you are having problems. It's also a good idea to know your test results and keep a list of the medicines you take. How can you care for yourself at home? · Practice healthy eating habits. This includes eating plenty of fruits, vegetables, whole grains, lean protein, and low-fat dairy. · If your doctor recommends it, get more exercise. Walking is a good choice. Bit by bit, increase the amount you walk every day. Try for at least 30 minutes on most days of the week. · Do not smoke. Smoking can increase your risk for health problems. If you need help quitting, talk to your doctor about stop-smoking programs and medicines. These can increase your chances of quitting for good. · Limit alcohol to 2 drinks a day for men and 1 drink a day for women. Too much alcohol can cause health problems. If you have a BMI higher than 25  · Your doctor may do other tests to check your risk for weight-related health problems. This may include measuring the distance around your waist. A waist measurement of more than 40 inches in men or 35 inches in women can increase the risk of weight-related health problems. · Talk with your doctor about steps you can take to stay healthy or improve your health. You may need to make lifestyle changes to lose weight and stay healthy, such as changing your diet and getting regular exercise. If you have a BMI lower than 18.5  · Your doctor may do other tests to check your risk for health problems. · Talk with your doctor about steps you can take to stay healthy or improve your health. You may need to make lifestyle changes to gain or maintain weight and stay healthy, such as getting more healthy foods in your diet and doing exercises to build muscle. Where can you learn more? Go to http://landy-chanel.info/. Enter S176 in the search box to learn more about \"Body Mass Index: Care Instructions. \"  Current as of: October 13, 2016  Content Version: 11.4  © 0331-7244 [employer]. Care instructions adapted under license by [employer] (which disclaims liability or warranty for this information). If you have questions about a medical condition or this instruction, always ask your healthcare professional. [address] any warranty or liability for your use of this information. Female

## 2023-08-16 ENCOUNTER — TRANSCRIPTION ENCOUNTER (OUTPATIENT)
Age: 28
End: 2023-08-16

## 2023-08-17 ENCOUNTER — RESULT REVIEW (OUTPATIENT)
Age: 28
End: 2023-08-17

## 2023-08-17 ENCOUNTER — APPOINTMENT (OUTPATIENT)
Dept: OBGYN | Facility: HOSPITAL | Age: 28
End: 2023-08-17

## 2023-08-17 ENCOUNTER — OUTPATIENT (OUTPATIENT)
Dept: OUTPATIENT SERVICES | Facility: HOSPITAL | Age: 28
LOS: 1 days | End: 2023-08-17
Payer: MEDICAID

## 2023-08-17 ENCOUNTER — TRANSCRIPTION ENCOUNTER (OUTPATIENT)
Age: 28
End: 2023-08-17

## 2023-08-17 VITALS
HEART RATE: 96 BPM | OXYGEN SATURATION: 100 % | TEMPERATURE: 99 F | DIASTOLIC BLOOD PRESSURE: 80 MMHG | RESPIRATION RATE: 16 BRPM | SYSTOLIC BLOOD PRESSURE: 123 MMHG

## 2023-08-17 VITALS
DIASTOLIC BLOOD PRESSURE: 75 MMHG | OXYGEN SATURATION: 99 % | SYSTOLIC BLOOD PRESSURE: 115 MMHG | HEIGHT: 59 IN | RESPIRATION RATE: 15 BRPM | WEIGHT: 145.06 LBS | TEMPERATURE: 100 F | HEART RATE: 113 BPM

## 2023-08-17 DIAGNOSIS — Z98.890 OTHER SPECIFIED POSTPROCEDURAL STATES: Chronic | ICD-10-CM

## 2023-08-17 DIAGNOSIS — N84.1 POLYP OF CERVIX UTERI: ICD-10-CM

## 2023-08-17 DIAGNOSIS — Z90.89 ACQUIRED ABSENCE OF OTHER ORGANS: Chronic | ICD-10-CM

## 2023-08-17 PROCEDURE — 57500 BIOPSY OF CERVIX: CPT

## 2023-08-17 PROCEDURE — 88305 TISSUE EXAM BY PATHOLOGIST: CPT

## 2023-08-17 PROCEDURE — 88175 CYTOPATH C/V AUTO FLUID REDO: CPT

## 2023-08-17 PROCEDURE — 88141 CYTOPATH C/V INTERPRET: CPT

## 2023-08-17 PROCEDURE — 88305 TISSUE EXAM BY PATHOLOGIST: CPT | Mod: 26

## 2023-08-17 PROCEDURE — 88142 CYTOPATH C/V THIN LAYER: CPT

## 2023-08-17 RX ORDER — HYDROMORPHONE HYDROCHLORIDE 2 MG/ML
0.5 INJECTION INTRAMUSCULAR; INTRAVENOUS; SUBCUTANEOUS
Refills: 0 | Status: DISCONTINUED | OUTPATIENT
Start: 2023-08-17 | End: 2023-08-17

## 2023-08-17 RX ORDER — CETIRIZINE HYDROCHLORIDE 10 MG/1
1 TABLET ORAL
Refills: 0 | DISCHARGE

## 2023-08-17 RX ORDER — IBUPROFEN 200 MG
600 TABLET ORAL ONCE
Refills: 0 | Status: COMPLETED | OUTPATIENT
Start: 2023-08-17 | End: 2023-08-17

## 2023-08-17 RX ORDER — BECLOMETHASONE DIPROPIONATE 40 UG/1
1 AEROSOL, METERED RESPIRATORY (INHALATION)
Refills: 0 | DISCHARGE

## 2023-08-17 RX ORDER — ONDANSETRON 8 MG/1
4 TABLET, FILM COATED ORAL ONCE
Refills: 0 | Status: DISCONTINUED | OUTPATIENT
Start: 2023-08-17 | End: 2023-08-17

## 2023-08-17 RX ORDER — SODIUM CHLORIDE 9 MG/ML
1000 INJECTION, SOLUTION INTRAVENOUS
Refills: 0 | Status: DISCONTINUED | OUTPATIENT
Start: 2023-08-17 | End: 2023-08-17

## 2023-08-17 RX ORDER — OXYCODONE HYDROCHLORIDE 5 MG/1
5 TABLET ORAL ONCE
Refills: 0 | Status: DISCONTINUED | OUTPATIENT
Start: 2023-08-17 | End: 2023-08-17

## 2023-08-17 RX ADMIN — Medication 600 MILLIGRAM(S): at 12:15

## 2023-08-17 RX ADMIN — SODIUM CHLORIDE 50 MILLILITER(S): 9 INJECTION, SOLUTION INTRAVENOUS at 08:47

## 2023-08-17 RX ADMIN — Medication 600 MILLIGRAM(S): at 13:00

## 2023-08-17 NOTE — ASU DISCHARGE PLAN (ADULT/PEDIATRIC) - CARE PROVIDER_API CALL
Carla Mccullough  Obstetrics and Gynecology  [address] [place] [address], [address]  [place] [address]  Phone: [phone]  Fax: [phone]  Follow Up Time: 2 weeks

## 2023-08-17 NOTE — ASU DISCHARGE PLAN (ADULT/PEDIATRIC) - DIET/FLUID RESTRICTION
Writer spoke with patient, is stable at this time, states that he is a transfer of care.  Apt cancelled    No change

## 2023-08-17 NOTE — ASU DISCHARGE PLAN (ADULT/PEDIATRIC) - ASU DC SPECIAL INSTRUCTIONSFT
For pain take Extra Strength Tylenol 500mg 2 tablets alternating with Motrin 600mg every 6 hours as needed for pain.    No exercise, heavy lifting, nothing per vagina, or tub baths for 2 weeks. Some bleeding/spotting is normal for 1-2 weeks. If does not stop or increases in severity call office.    Follow up in office w/ Dr. Mccullough in 2 weeks, call office to make/confirm appointment.

## 2023-08-17 NOTE — BRIEF OPERATIVE NOTE - SPECIMENS
[facility]      TRIAGE CHIEF COMPLAINT:   Hypertension (history of, unknown if facility gave meds) and Other (abnormal labs)      [place]:  Desirae Tamez is a 28 y.o. male that presents from [facility] with complaint of high blood pressure. Patient apparently is scheduled for dialysis today his last treatment was on Wednesday. Unclear baseline but he is in a nursing home on arrival there is no family or staff present he does appear confused his blood pressure is elevated per nursing home he has not had his blood pressure medicine today. No other questions or concerns could possibly be septic per nursing home as he gets septic once a month. No further HPI available. REVIEW OF SYSTEMS:      Review of Systems   Unable to perform ROS: Mental status change   Constitutional: Positive for activity change and fatigue. Past Medical History:   Diagnosis Date    Diabetes mellitus type 1 ([facility] [facility]     Diabetic amyotrophia (HCC)     End stage kidney disease ([facility] [facility]     MRSA (methicillin resistant staph aureus) culture positive 08/02/2021    Coccyx    Multiple drug resistant organism (MDRO) culture positive 08/02/2021    Skin breakdown     VRE (vancomycin resistant enterococcus) culture positive 03/26/2021     History reviewed. No pertinent surgical history. History reviewed. No pertinent family history.   Social History     Socioeconomic History    Marital status: Single     Spouse name: Not on file    Number of children: Not on file    Years of education: Not on file    Highest education level: Not on file   Occupational History    Not on file   Tobacco Use    Smoking status: Never Smoker    Smokeless tobacco: Never Used   Vaping Use    Vaping Use: Never used   Substance and Sexual Activity    Alcohol use: Not Currently    Drug use: Not Currently     Types: Marijuana    Sexual activity: Not Currently   Other Topics Concern    Not on file   Social History Narrative    Not on file     Social Determinants of Health     Financial Resource Strain:     Difficulty of Paying Living Expenses:    Food Insecurity:     Worried About Running Out of Food in the Last Year:     [address] in the Last Year:    Transportation Needs:     Lack of Transportation (Medical):      Lack of Transportation (Non-Medical):    Physical Activity:     Days of Exercise per Week:     Minutes of Exercise per Session:    Stress:     Feeling of Stress :    Social Connections:     Frequency of Communication with Friends and Family:     Frequency of Social Gatherings with Friends and Family:     Attends Synagogue Services:     Active Member of Clubs or Organizations:     Attends Club or Organization Meetings:     Marital Status:    Intimate Partner Violence:     Fear of Current or Ex-Partner:     Emotionally Abused:     Physically Abused:     Sexually Abused:      Current Facility-Administered Medications   Medication Dose Route Frequency Provider Last Rate Last Admin    niCARdipine (CARDENE) 50 mg in dextrose 5 % 250 mL infusion  3-15 mg/hr IntraVENous Continuous Cody Hunter DO 15 mL/hr at 10/01/21 1021 3 mg/hr at 10/01/21 1021    carvedilol (COREG) tablet 25 mg  25 mg Oral BID WC Cody Hunter DO   25 mg at 10/01/21 1013    cefepime (MAXIPIME) 2000 mg IVPB minibag  2,000 mg IntraVENous Once Ligandal, DO        sodium chloride flush 0.9 % injection 5-40 mL  5-40 mL IntraVENous 2 times per day Soumya Martins DO        sodium chloride flush 0.9 % injection 5-40 mL  5-40 mL IntraVENous PRN Soumya Martins DO        0.9 % sodium chloride infusion  25 mL IntraVENous PRN Soumya Martins DO        enoxaparin (LOVENOX) injection 40 mg  40 mg SubCUTAneous Daily Soumya Martins DO        acetaminophen (TYLENOL) tablet 650 mg  650 mg Oral Q6H PRN Soumya Martins DO        Or    acetaminophen (TYLENOL) suppository 650 mg  650 mg Rectal Q6H PRN Soumya Martins DO        piperacillin-tazobactam (ZOSYN) 2,250 mg in dextrose 5 % 50 mL IVPB (mini-bag)  2,250 mg IntraVENous Q8H Chelsea Prime, DO        vancomycin (VANCOCIN) 2000 mg in 400 mL IVPB  2,000 mg IntraVENous Once Chelsea Prime, DO        [START ON 10/2/2021] vancomycin (VANCOCIN) intermittent dosing (placeholder)   Other RX Placeholder Chelsea Prime, DO         Current Outpatient Medications   Medication Sig Dispense Refill    HYDROcodone-acetaminophen (NORCO)  MG per tablet Take 1 tablet by mouth three times a week. Receives routinely on Dialysis Days Mon/Wed/Fri      midodrine (PROAMATINE) 10 MG tablet Take 10 mg by mouth three times a week Takes piror to Dialysis Days and half way through dialysis Mon/Wed/Fri      acetaminophen (TYLENOL) 325 MG tablet Take 650 mg by mouth every 6 hours as needed for Pain or Fever      atorvastatin (LIPITOR) 80 MG tablet Take 80 mg by mouth nightly      baclofen (LIORESAL) 10 MG tablet Take 10 mg by mouth daily      carvedilol (COREG) 25 MG tablet Take 25 mg by mouth 2 times daily (with meals)      traMADol (ULTRAM) 50 MG tablet Take 50 mg by mouth every 6 hours as needed for Pain. Give routinely piror to treatment      cloNIDine (CATAPRES) 0.1 MG tablet Take 0.1 mg by mouth every 4 hours as needed for High Blood Pressure      dicyclomine (BENTYL) 20 MG tablet Take 20 mg by mouth every 6 hours      apixaban (ELIQUIS) 2.5 MG TABS tablet Take 2.5 mg by mouth 2 times daily      escitalopram (LEXAPRO) 10 MG tablet Take 10 mg by mouth daily      tamsulosin (FLOMAX) 0.4 MG capsule Take 0.4 mg by mouth daily      folic acid (FOLVITE) 1 MG tablet Take 1 mg by mouth daily      furosemide (LASIX) 80 MG tablet Take 80 mg by mouth daily      gabapentin (NEURONTIN) 300 MG capsule Take 300 mg by mouth 3 times daily.       hydrALAZINE (APRESOLINE) 25 MG tablet Take 25 mg by mouth daily      HYDROcodone-acetaminophen (NORCO) 5-325 MG per tablet Take 1 tablet by mouth every 6 hours as needed for Pain.  insulin lispro (HUMALOG) 100 UNIT/ML injection vial Inject into the skin 4 times daily (with meals and nightly) Sliding Scale: If BG 0-150 = 0 units  If 151-200 = 2 units  If 201-250 = 4 units  If 251-300 = 6 units  If 301-350 = 8 units  If 351-400 = 10 units      lactase (LACTAID) 3000 units tablet Take 1 tablet by mouth 3 times daily (with meals)      insulin glargine (LANTUS) 100 UNIT/ML injection vial Inject 12 Units into the skin nightly       pregabalin (LYRICA) 75 MG capsule Take 75 mg by mouth 2 times daily.       melatonin 3 MG TABS tablet Take 3 mg by mouth nightly      mirtazapine (REMERON) 7.5 MG tablet Take 7.5 mg by mouth nightly       nystatin (MYCOSTATIN) POWD powder Apply topically 2 times daily Apply to groin and scrotum topically every morning and at bedtime for skin irritation      promethazine (PHENERGAN) 12.5 MG tablet Take 12.5 mg by mouth every 6 hours as needed for Nausea      Multiple Vitamins-Minerals (PRORENAL + D) TABS Take 1 tablet by mouth daily      sevelamer (RENVELA) 800 MG tablet Take 1 tablet by mouth 3 times daily (with meals)      simethicone (MYLICON) 80 MG chewable tablet Take 80 mg by mouth every 6 hours as needed for Flatulence        Allergies   Allergen Reactions    Oxycodone      Violent    Rondec-D [Chlophedianol-Pseudoephedrine]      \"spacey\"     Current Facility-Administered Medications   Medication Dose Route Frequency Provider Last Rate Last Admin    niCARdipine (CARDENE) 50 mg in dextrose 5 % 250 mL infusion  3-15 mg/hr IntraVENous Continuous Cody Hunter DO 15 mL/hr at 10/01/21 1021 3 mg/hr at 10/01/21 1021    carvedilol (COREG) tablet 25 mg  25 mg Oral BID  Cody Hunter DO   25 mg at 10/01/21 1013    cefepime (MAXIPIME) 2000 mg IVPB minibag  2,000 mg IntraVENous Once [email or web address], DO        sodium chloride flush 0.9 % injection 5-40 mL  5-40 mL IntraVENous 2 times per day Paul Co, DO        sodium chloride flush 0.9 % injection 5-40 mL  5-40 mL IntraVENous PRN Eunice Mas, DO        0.9 % sodium chloride infusion  25 mL IntraVENous PRN Eunice Mas, DO        enoxaparin (LOVENOX) injection 40 mg  40 mg SubCUTAneous Daily Eunice Mas, DO        acetaminophen (TYLENOL) tablet 650 mg  650 mg Oral Q6H PRN Eunice Mas, DO        Or    acetaminophen (TYLENOL) suppository 650 mg  650 mg Rectal Q6H PRN Eunice Mas, DO        piperacillin-tazobactam (ZOSYN) 2,250 mg in dextrose 5 % 50 mL IVPB (mini-bag)  2,250 mg IntraVENous Q8H Eunice Mas, DO        vancomycin (VANCOCIN) 2000 mg in 400 mL IVPB  2,000 mg IntraVENous Once Eunice Mas, DO        [START ON 10/2/2021] vancomycin (VANCOCIN) intermittent dosing (placeholder)   Other RX Placeholder Eunice Mas, DO         Current Outpatient Medications   Medication Sig Dispense Refill    HYDROcodone-acetaminophen (NORCO)  MG per tablet Take 1 tablet by mouth three times a week. Receives routinely on Dialysis Days Mon/Wed/Fri      midodrine (PROAMATINE) 10 MG tablet Take 10 mg by mouth three times a week Takes piror to Dialysis Days and half way through dialysis Mon/Wed/Fri      acetaminophen (TYLENOL) 325 MG tablet Take 650 mg by mouth every 6 hours as needed for Pain or Fever      atorvastatin (LIPITOR) 80 MG tablet Take 80 mg by mouth nightly      baclofen (LIORESAL) 10 MG tablet Take 10 mg by mouth daily      carvedilol (COREG) 25 MG tablet Take 25 mg by mouth 2 times daily (with meals)      traMADol (ULTRAM) 50 MG tablet Take 50 mg by mouth every 6 hours as needed for Pain.  Give routinely piror to treatment      cloNIDine (CATAPRES) 0.1 MG tablet Take 0.1 mg by mouth every 4 hours as needed for High Blood Pressure      dicyclomine (BENTYL) 20 MG tablet Take 20 mg by mouth every 6 hours      apixaban (ELIQUIS) 2.5 MG TABS tablet Take 2.5 mg by mouth 2 times daily      escitalopram (LEXAPRO) 10 MG tablet Take 10 mg by mouth daily      tamsulosin (FLOMAX) 0.4 MG capsule Take 0.4 mg by mouth daily      folic acid (FOLVITE) 1 MG tablet Take 1 mg by mouth daily      furosemide (LASIX) 80 MG tablet Take 80 mg by mouth daily      gabapentin (NEURONTIN) 300 MG capsule Take 300 mg by mouth 3 times daily.  hydrALAZINE (APRESOLINE) 25 MG tablet Take 25 mg by mouth daily      HYDROcodone-acetaminophen (NORCO) 5-325 MG per tablet Take 1 tablet by mouth every 6 hours as needed for Pain.  insulin lispro (HUMALOG) 100 UNIT/ML injection vial Inject into the skin 4 times daily (with meals and nightly) Sliding Scale: If BG 0-150 = 0 units  If 151-200 = 2 units  If 201-250 = 4 units  If 251-300 = 6 units  If 301-350 = 8 units  If 351-400 = 10 units      lactase (LACTAID) 3000 units tablet Take 1 tablet by mouth 3 times daily (with meals)      insulin glargine (LANTUS) 100 UNIT/ML injection vial Inject 12 Units into the skin nightly       pregabalin (LYRICA) 75 MG capsule Take 75 mg by mouth 2 times daily.       melatonin 3 MG TABS tablet Take 3 mg by mouth nightly      mirtazapine (REMERON) 7.5 MG tablet Take 7.5 mg by mouth nightly       nystatin (MYCOSTATIN) POWD powder Apply topically 2 times daily Apply to groin and scrotum topically every morning and at bedtime for skin irritation      promethazine (PHENERGAN) 12.5 MG tablet Take 12.5 mg by mouth every 6 hours as needed for Nausea      Multiple Vitamins-Minerals (PRORENAL + D) TABS Take 1 tablet by mouth daily      sevelamer (RENVELA) 800 MG tablet Take 1 tablet by mouth 3 times daily (with meals)      simethicone (MYLICON) 80 MG chewable tablet Take 80 mg by mouth every 6 hours as needed for Flatulence         Nursing Notes Reviewed    VITAL SIGNS:  ED Triage Vitals   Enc Vitals Group      BP       Pulse       Resp       Temp       Temp src       SpO2       Weight       Height       Head Circumference       Peak Flow       Pain Score       Pain Loc       Pain Edu? Excl. in [address]? PHYSICAL EXAM:  Physical Exam  Vitals and nursing note reviewed. Constitutional:       General: He is not in acute distress. Appearance: Normal appearance. He is well-developed and well-groomed. He is not ill-appearing, toxic-appearing or diaphoretic. HENT:      Head: Normocephalic and atraumatic. Right Ear: External ear normal.      Left Ear: External ear normal.      Nose: No congestion or rhinorrhea. Eyes:      General: No scleral icterus. Right eye: No discharge. Left eye: No discharge. Extraocular Movements: Extraocular movements intact. Conjunctiva/sclera: Conjunctivae normal.   Neck:      Vascular: No JVD. Trachea: Phonation normal.   Cardiovascular:      Rate and Rhythm: Regular rhythm. Tachycardia present. Pulses: Normal pulses. Heart sounds: Normal heart sounds. No murmur heard. No friction rub. No gallop. Pulmonary:      Effort: Pulmonary effort is normal. No respiratory distress. Breath sounds: Normal breath sounds. No stridor. No wheezing, rhonchi or rales. Abdominal:      General: The ostomy site is clean. Bowel sounds are normal. There is no distension. There are no signs of injury. Palpations: There is no mass or pulsatile mass. Tenderness: There is no abdominal tenderness. There is no guarding or rebound. Negative signs include Crowell's sign, Rovsing's sign and McBurney's sign. Hernia: No hernia is present. Musculoskeletal:         General: No swelling, tenderness, deformity or signs of injury. Normal range of motion. Cervical back: Full passive range of motion without pain and normal range of motion. No edema, erythema, signs of trauma, rigidity, torticollis or crepitus. No pain with movement. Normal range of motion. Right lower leg: No edema. Left lower leg: No edema. Skin:     General: Skin is warm. Coloration: Skin is pale. Skin is not jaundiced.       Findings: No polyp of anterior lip of cervix bruising, erythema, lesion or rash. Neurological:      Mental Status: He is alert. He is disoriented and confused. GCS: GCS eye subscore is 4. GCS verbal subscore is 4. GCS motor subscore is 6. Cranial Nerves: Cranial nerves are intact. No cranial nerve deficit, dysarthria or facial asymmetry. Sensory: Sensation is intact. No sensory deficit. Motor: Weakness present. No tremor, atrophy, abnormal muscle tone or seizure activity. Comments: General weakness, nothing focal   Psychiatric:         Behavior: Behavior is cooperative.            I have reviewed andinterpreted all of the currently available lab results from this visit (if applicable):    Results for orders placed or performed during the hospital encounter of 10/01/21   CBC Auto Differential   Result Value Ref Range    WBC 19.7 (H) 4.0 - 10.5 K/CU MM    RBC 3.70 (L) 4.6 - 6.2 M/CU MM    Hemoglobin 9.9 (L) 13.5 - 18.0 GM/DL    Hematocrit 34.0 (L) 42 - 52 %    MCV 91.9 78 - 100 FL    MCH 26.8 (L) 27 - 31 PG    MCHC 29.1 (L) 32.0 - 36.0 %    RDW 15.5 (H) 11.7 - 14.9 %    Platelets 264 (H) 040 - 440 K/CU MM    MPV 9.6 7.5 - 11.1 FL    Differential Type AUTOMATED DIFFERENTIAL     Segs Relative 88.7 (H) 36 - 66 %    Lymphocytes % 5.4 (L) 24 - 44 %    Monocytes % 4.1 (H) 0 - 4 %    Eosinophils % 0.7 0 - 3 %    Basophils % 0.4 0 - 1 %    Segs Absolute 17.5 K/CU MM    Lymphocytes Absolute 1.1 K/CU MM    Monocytes Absolute 0.8 K/CU MM    Eosinophils Absolute 0.1 K/CU MM    Basophils Absolute 0.1 K/CU MM    Nucleated RBC % 0.0 %    Total Nucleated RBC 0.0 K/CU MM    Total Immature Neutrophil 0.13 K/CU MM    Immature Neutrophil % 0.7 (H) 0 - 0.43 %   CMP   Result Value Ref Range    Sodium 128 (L) 135 - 145 MMOL/L    Potassium 5.3 (H) 3.5 - 5.1 MMOL/L    Chloride 89 (L) 99 - 110 mMol/L    CO2 19 (L) 21 - 32 MMOL/L    BUN 54 (H) 6 - 23 MG/DL    CREATININE 3.5 (H) 0.9 - 1.3 MG/DL    Glucose 85 70 - 99 MG/DL    Calcium 9.4 8.3 - 10.6 MG/DL    Albumin 2.9 (L) 3.4 - 5.0 GM/DL    Total Protein 6.8 6.4 - 8.2 GM/DL    Total Bilirubin 0.4 0.0 - 1.0 MG/DL    ALT 45 (H) 10 - 40 U/L    AST 20 15 - 37 IU/L    Alkaline Phosphatase 781 (H) 40 - 129 IU/L    GFR Non-African American 20 (L) >60 mL/min/1.73m2    GFR  25 (L) >60 mL/min/1.73m2    Anion Gap 20 (H) 4 - 16   Troponin   Result Value Ref Range    Troponin T 1.880 (HH) <0.01 NG/ML   Brain Natriuretic Peptide   Result Value Ref Range    Pro-BNP >70,000 (H) <300 PG/ML   Lipase   Result Value Ref Range    Lipase 10 (L) 13 - 60 IU/L   Blood Gas, Venous   Result Value Ref Range    pH, Walt 7.39 7.32 - 7.42    pCO2, Walt 36 (L) 38 - 52 mmHG    pO2, Walt 124 (H) 28 - 48 mmHG    Base Excess 3 0 - 3.3    HCO3, Venous 21.8 19 - 25 MMOL/L    O2 Sat, Walt 94.0 (H) 50 - 70 %    Comment VBG    Beta-Hydroxybutyrate   Result Value Ref Range    Beta-Hydroxybutyrate 20.2 (H) 0.0 - 3.0 MG/DL   Magnesium   Result Value Ref Range    Magnesium 2.7 (H) 1.8 - 2.4 mg/dl   TSH without Reflex   Result Value Ref Range    TSH, High Sensitivity 1.580 0.270 - 4.20 uIu/ml   CK   Result Value Ref Range    Total CK 56 38 - 174 IU/L   POCT Glucose   Result Value Ref Range    POC Glucose 82 70 - 99 MG/DL   EKG 12 Lead   Result Value Ref Range    Ventricular Rate 107 BPM    Atrial Rate 107 BPM    P-R Interval 138 ms    QRS Duration 80 ms    Q-T Interval 352 ms    QTc Calculation (Bazett) 469 ms    P Axis 40 degrees    R Axis 43 degrees    T Axis 51 degrees    Diagnosis       Sinus tachycardia  Otherwise normal ECG  When compared with ECG of 06-SEP-2021 15:22,  Nonspecific T wave abnormality now evident in Inferior leads  T wave inversion now evident in Lateral leads          Radiographs (if obtained):  [] The following radiograph was interpreted by myself in the absence of a radiologist:  [x] Radiologist's Report Reviewed:    CXR, CT Brain, CT abd/pelv    XR CHEST PORTABLE    Result Date: 9/7/2021  EXAMINATION: ONE XRAY VIEW OF THE CHEST 9/6/2021 1:56 pm COMPARISON: 08/22/2021 HISTORY: ORDERING SYSTEM PROVIDED HISTORY: loc TECHNOLOGIST PROVIDED HISTORY: Reason for exam:->loc Reason for Exam:  loc FINDINGS: Dual lumen central venous catheter is stable. Cardiomegaly. There is a small right and moderate left pleural effusion, stable. No pneumothorax. Mild interstitial prominence likely representing interstitial pulmonary edema related to CHF. No pneumothorax. Interstitial pulmonary edema with moderate left and small right pleural effusions as well as cardiomegaly suggesting moderate CHF. EKG (if obtained): (All EKG's are interpreted by myself in the absence of a cardiologist)    12 lead EKG per my interpretation #1:  Sinus Tachycardia 107  Axis is   Normal  QTc is  469  There is no specific T wave changes appreciated. There is no specific ST wave changes appreciated. Prior EKG to compare with was not available       12 lead EKG per my interpretation #2:  Normal Sinus Rhythm 81  Axis is   Normal  QTc is  501  There is no specific T wave changes appreciated. There is no specific ST wave changes appreciated. Prior EKG to compare with was not available       Total critical care time today provided was at least 50 minutes. This excludes seperately billable procedure. Critical care time provided for reviewing labs, images, giving home blood pressure meds, giving nicardipine, consulting nephrology, giving antibiotics, consulting medicine that required close evaluation and/or intervention with concern for patient decompensation. MDM:    Patient here with high blood pressure, chronic kidney disease on dialysis. Again from [facility]. Patient on arrival does appear confused unknown baseline. Per nursing home he is not had his blood pressure medicines today he is elevated here I did order home blood pressure medications as well as nicardipine.   He is on Monday Wednesday Friday dialysis he is due today he last had a treatment on Wednesday. He otherwise appears in no distress he is mildly tachycardic per nursing home he also gets septic about once a month. Will get labs, imaging will consult nephrology. Nephrology consulted and following. Patient otherwise stable work-up performed and does have elevated white count, chronic decubitus ulcers chronic osteomyelitis. I did order antibiotics broad-spectrum. Again I did give him his blood pressure medicines, home dose as well as nicardipine drip. Imaging otherwise shows no acute abnormality except for pleural effusions likely needs dialysis. Troponin level BNP elevated but chronically so due to chronic kidney disease. EKG negative. I did talk to nephrology medicine will admit otherwise stable. No signs of DKA his pH is within normal limits. Patient was admitted and taken to dialysis without initially any problem. A rapid response was called on dialysis I did go to. He is more lethargic than before and slightly diaphoretic but vital signs otherwise stable he is arousable protecting airway he is on a couple liters of oxygen. Hospital team down there as well respiratory therapy and nursing staff. Repeat EKG shows slight prolonged QTC I did give him magnesium. He had a magnesium level of 2.7. Explained that this is likely sepsis to hospital medicine and imaging and result findings from earlier. Repeat blood sugar in the 80s. Patient otherwise stable admitted to ICU now.     CLINICAL IMPRESSION:  Final diagnoses:   Hypertension, unspecified type   Chronic kidney disease, unspecified CKD stage   Leukocytosis, unspecified type   Troponin level elevated   Altered mental status, unspecified altered mental status type   Elevated brain natriuretic peptide (BNP) level       (Please note that portions of this note may have been completed with a voice recognition program. Efforts were made to edit the dictations but occasionally words aremis-transcribed.)    DISPOSITION REFERRAL (if applicable):  No follow-up provider specified.     DISPOSITION MEDICATIONS (if applicable):  New Prescriptions    No medications on file          Millie Payne, [facility], DO  10/01/21 [address], DO  10/01/21 7247

## 2023-08-17 NOTE — ASU DISCHARGE PLAN (ADULT/PEDIATRIC) - NS MD DC FALL RISK RISK
For information on Fall & Injury Prevention, visit: [email or web address] OR  [email or web address] OR  https://www.cdc.gov/steadi/patient.html

## 2023-08-17 NOTE — BRIEF OPERATIVE NOTE - NSICDXBRIEFPROCEDURE_GEN_ALL_CORE_FT
PROCEDURES:  Exam under anesthesia, vagina 17-Aug-2023 10:56:37  Carla Mccullough  Cervical polypectomy 17-Aug-2023 10:56:51  Carla Mccullough

## 2023-08-18 LAB — SURGICAL PATHOLOGY STUDY: SIGNIFICANT CHANGE UP

## 2023-08-21 ENCOUNTER — NON-APPOINTMENT (OUTPATIENT)
Age: 28
End: 2023-08-21

## 2023-08-23 LAB — CYTOLOGY SPEC DOC CYTO: SIGNIFICANT CHANGE UP

## 2023-08-25 ENCOUNTER — NON-APPOINTMENT (OUTPATIENT)
Age: 28
End: 2023-08-25

## 2023-08-28 ENCOUNTER — APPOINTMENT (OUTPATIENT)
Dept: OBGYN | Facility: CLINIC | Age: 28
End: 2023-08-28

## 2023-09-13 ENCOUNTER — APPOINTMENT (OUTPATIENT)
Dept: FAMILY MEDICINE | Facility: CLINIC | Age: 28
End: 2023-09-13
Payer: MEDICAID

## 2023-09-13 DIAGNOSIS — Z23 ENCOUNTER FOR IMMUNIZATION: ICD-10-CM

## 2023-09-13 PROBLEM — N84.1 POLYP OF CERVIX UTERI: Chronic | Status: ACTIVE | Noted: 2023-08-14

## 2023-09-13 PROCEDURE — G0008: CPT

## 2023-09-13 PROCEDURE — 90658 IIV3 VACCINE SPLT 0.5 ML IM: CPT

## 2023-09-19 ENCOUNTER — APPOINTMENT (OUTPATIENT)
Dept: PULMONOLOGY | Facility: CLINIC | Age: 28
End: 2023-09-19

## 2023-09-19 ENCOUNTER — NON-APPOINTMENT (OUTPATIENT)
Age: 28
End: 2023-09-19

## 2023-09-20 ENCOUNTER — APPOINTMENT (OUTPATIENT)
Dept: RADIOLOGY | Facility: HOSPITAL | Age: 28
End: 2023-09-20
Payer: MEDICAID

## 2023-09-20 ENCOUNTER — OUTPATIENT (OUTPATIENT)
Dept: OUTPATIENT SERVICES | Facility: HOSPITAL | Age: 28
LOS: 1 days | End: 2023-09-20
Payer: MEDICAID

## 2023-09-20 ENCOUNTER — APPOINTMENT (OUTPATIENT)
Dept: PULMONOLOGY | Facility: CLINIC | Age: 28
End: 2023-09-20
Payer: MEDICAID

## 2023-09-20 VITALS
DIASTOLIC BLOOD PRESSURE: 77 MMHG | HEIGHT: 59 IN | TEMPERATURE: 97.6 F | OXYGEN SATURATION: 99 % | HEART RATE: 107 BPM | WEIGHT: 144 LBS | SYSTOLIC BLOOD PRESSURE: 116 MMHG | BODY MASS INDEX: 29.03 KG/M2

## 2023-09-20 DIAGNOSIS — Z98.890 OTHER SPECIFIED POSTPROCEDURAL STATES: Chronic | ICD-10-CM

## 2023-09-20 DIAGNOSIS — Z90.89 ACQUIRED ABSENCE OF OTHER ORGANS: Chronic | ICD-10-CM

## 2023-09-20 DIAGNOSIS — J45.909 UNSPECIFIED ASTHMA, UNCOMPLICATED: ICD-10-CM

## 2023-09-20 PROCEDURE — 71046 X-RAY EXAM CHEST 2 VIEWS: CPT | Mod: 26

## 2023-09-20 PROCEDURE — 99213 OFFICE O/P EST LOW 20 MIN: CPT

## 2023-09-20 PROCEDURE — 71046 X-RAY EXAM CHEST 2 VIEWS: CPT

## 2023-10-07 ENCOUNTER — EMERGENCY (EMERGENCY)
Facility: HOSPITAL | Age: 28
LOS: 1 days | Discharge: HOME | End: 2023-10-07
Attending: EMERGENCY MEDICINE | Admitting: EMERGENCY MEDICINE
Payer: MEDICAID

## 2023-10-07 VITALS
HEART RATE: 100 BPM | WEIGHT: 139.99 LBS | OXYGEN SATURATION: 99 % | DIASTOLIC BLOOD PRESSURE: 85 MMHG | RESPIRATION RATE: 18 BRPM | TEMPERATURE: 98 F | HEIGHT: 59 IN | SYSTOLIC BLOOD PRESSURE: 137 MMHG

## 2023-10-07 DIAGNOSIS — Z98.890 OTHER SPECIFIED POSTPROCEDURAL STATES: Chronic | ICD-10-CM

## 2023-10-07 DIAGNOSIS — Z90.89 ACQUIRED ABSENCE OF OTHER ORGANS: Chronic | ICD-10-CM

## 2023-10-07 PROCEDURE — 99283 EMERGENCY DEPT VISIT LOW MDM: CPT

## 2023-10-07 PROCEDURE — 99284 EMERGENCY DEPT VISIT MOD MDM: CPT

## 2023-10-07 PROCEDURE — 71046 X-RAY EXAM CHEST 2 VIEWS: CPT

## 2023-10-07 PROCEDURE — 99284 EMERGENCY DEPT VISIT MOD MDM: CPT | Mod: 25

## 2023-10-07 PROCEDURE — 71046 X-RAY EXAM CHEST 2 VIEWS: CPT | Mod: 26

## 2023-10-07 RX ORDER — CYCLOBENZAPRINE HYDROCHLORIDE 10 MG/1
1 TABLET, FILM COATED ORAL
Qty: 21 | Refills: 0
Start: 2023-10-07 | End: 2023-10-13

## 2023-10-07 RX ORDER — DIAZEPAM 5 MG
1 TABLET ORAL
Qty: 6 | Refills: 0
Start: 2023-10-07 | End: 2023-10-09

## 2023-10-07 RX ORDER — IBUPROFEN 200 MG
1 TABLET ORAL
Qty: 21 | Refills: 0
Start: 2023-10-07 | End: 2023-10-13

## 2023-10-07 RX ORDER — CYCLOBENZAPRINE HYDROCHLORIDE 10 MG/1
10 TABLET, FILM COATED ORAL ONCE
Refills: 0 | Status: COMPLETED | OUTPATIENT
Start: 2023-10-07 | End: 2023-10-07

## 2023-10-07 RX ORDER — LIDOCAINE 4 G/100G
1 CREAM TOPICAL ONCE
Refills: 0 | Status: COMPLETED | OUTPATIENT
Start: 2023-10-07 | End: 2023-10-07

## 2023-10-07 RX ORDER — NORETHINDRONE AND ETHINYL ESTRADIOL 0.4-0.035
1 KIT ORAL
Refills: 0 | DISCHARGE

## 2023-10-07 RX ORDER — IBUPROFEN 200 MG
600 TABLET ORAL ONCE
Refills: 0 | Status: COMPLETED | OUTPATIENT
Start: 2023-10-07 | End: 2023-10-07

## 2023-10-07 RX ADMIN — Medication 600 MILLIGRAM(S): at 08:30

## 2023-10-07 RX ADMIN — LIDOCAINE 1 PATCH: 4 CREAM TOPICAL at 07:48

## 2023-10-07 RX ADMIN — CYCLOBENZAPRINE HYDROCHLORIDE 10 MILLIGRAM(S): 10 TABLET, FILM COATED ORAL at 07:47

## 2023-10-07 RX ADMIN — Medication 600 MILLIGRAM(S): at 07:48

## 2023-10-07 NOTE — ED ADULT TRIAGE NOTE - BP NONINVASIVE SYSTOLIC (MM HG)
86y/o female with pmh of chronic back pain, dm, hypothyroidism, pt. is here s/p mechanical fall earlier today. pt. lives by herself ambulates with a walker. pt. states she returned from shopping when something happened to her walker where it ended up falling and dragging pt. with it. pt. fell on both of her knees. denies head injury, no loc, pt. is not on anticoagulation. denies cp, sob, ha. pt. c/o back pain and b/l knee pain. pt. states that lately she has been having difficulties ambulating. she does not feel stable when she walks. pt. was placed in obs for rehab/pt consultation. 137

## 2023-10-07 NOTE — ED PROVIDER NOTE - NSICDXPASTMEDICALHX_GEN_ALL_CORE_FT
PAST MEDICAL HISTORY:  Asthma     Polyp of cervix      PAST MEDICAL HISTORY:  Asthma cough variant    Polyp of cervix

## 2023-10-07 NOTE — ED PROVIDER NOTE - PATIENT PORTAL LINK FT
You can access the FollowMyHealth Patient Portal offered by [facility] by registering at the following website: [email or web address]. By joining [employer]’s FollowMyHealth portal, you will also be able to view your health information using other applications (apps) compatible with our system.

## 2023-10-07 NOTE — ED PROVIDER NOTE - CLINICAL SUMMARY MEDICAL DECISION MAKING FREE TEXT BOX
The patient is a 28y Female who has a past medical and surgery history of Cough variant Asthma Polyp of cervical polyps ear surgery adenoidectomy middle ear reconstruction PTED with traumatic right middle back pain that began yesterday twisting to grab something into the car no radiation continency issues anesthesia no cough sputum hemoptysis no UTI s/s no sick contacts     Vital Signs Last 24 Hrs  T(F): 97.6 HR: 100 BP: 137/85 RR: 18 SpO2: 99% (07 Oct 2023 06:20)   PE: as described; my additions and exceptions are noted in the chart    IMPRESSION/RISK:  Dx=MSK back pain   Consideration include no "red flags" warranting emergent imaging will do CXR as eval of mid back pain and to r/o PNA/effusion as etiology   Plan  as above  symptomatic tx  reassess  if negative d/c with same and outpt followup

## 2023-10-07 NOTE — ED ADULT TRIAGE NOTE - CHIEF COMPLAINT QUOTE
I have back pain from yesterday. I must have turned the wrong way as I was trying to grab something in the car"

## 2023-10-07 NOTE — ED ADULT NURSE NOTE - AS PAIN REST
Bess the physical therapist at [facility], [place], operated by [facility] is looking for verbal order for continue physical therapy  twice a week for five week, starting next week to advance  walking and balance skills after left hip surgery.   Bess is doing re-assessment next week and and fax over for signature. But will still need verbal in the meantime.         Please contact Bess with orders.        6 (moderate pain)

## 2023-10-07 NOTE — ED PROVIDER NOTE - OBJECTIVE STATEMENT
The patient is a 28y Female who has a past medical and surgery history of Cough variant Asthma Polyp of cervical polyps ear surgery adenoidectomy middle ear reconstruction PTED with traumatic right middle back pain that began yesterday twisting to grab something into the car no radiation continency issues anesthesia no cough sputum hemoptysis no UTI s/s no sick contacts

## 2023-10-07 NOTE — ED PROVIDER NOTE - NSFOLLOWUPINSTRUCTIONS_ED_ALL_ED_FT
[facility]  Orthopedic Surgery  [address] [place] [address]  Phone: [phone]  Fax:   Follow Up Time:     [facility]  Orthopedic Surgery  [address], 3rd & 4th [place] [address]  Phone: [phone]  Fax:   Follow Up Time:     [facility] [place]  Orthopedics  .  [place]   Phone: [phone]  Fax:   Follow Up Time:     [facility]  Orthopedics  [address] [place] [address]  Phone: [phone]  Fax: [phone]  Follow Up Time:     [facility]  Orthopedics  [address] [place] [address]  Phone: [phone]  Fax:   Follow Up Time:     Back Pain  WHAT YOU NEED TO KNOW:  Back pain is common. It can be caused by many conditions, such as arthritis or the breakdown of spinal discs. Your risk for back pain is increased by injuries, lack of activity, or repeated bending and twisting. You may feel sore or stiff on one or both sides of your back. The pain may spread to your buttocks or thighs.  DISCHARGE INSTRUCTIONS:  Return to the emergency department if:   •You have pain, numbness, or weakness in one or both legs.  •Your pain becomes so severe that you cannot walk.  •You cannot control your urine or bowel movements.  •You have severe back pain with chest pain.  •You have severe back pain, nausea, and vomiting.  •You have severe back pain that spreads to your side or genital area.  Contact your healthcare provider if:   •You have back pain that does not get better with rest and pain medicine.  •You have a fever.  •You have pain that worsens when you are on your back or when you rest.  •You have pain that worsens when you cough or sneeze.  •You lose weight without trying.  •You have questions or concerns about your condition or care.  Medicines:   •NSAIDs help decrease swelling and pain. This medicine is available with or without a doctor's order. NSAIDs can cause stomach bleeding or kidney problems in certain people. If you take blood thinner medicine, always ask your healthcare provider if NSAIDs are safe for you. Always read the medicine label and follow directions.  •Acetaminophen decreases pain and fever. It is available without a doctor's order. Ask how much to take and how often to take it. Follow directions. Read the labels of all other medicines you are using to see if they also contain acetaminophen, or ask your doctor or pharmacist. Acetaminophen can cause liver damage if not taken correctly. Do not use more than 4 grams (4,000 milligrams) total of acetaminophen in one day.   •Muscle relaxers help decrease muscle spasms and back pain.  •Prescription pain medicine may be given. Ask your healthcare provider how to take this medicine safely. Some prescription pain medicines contain acetaminophen. Do not take other medicines that contain acetaminophen without talking to your healthcare provider. Too much acetaminophen may cause liver damage. Prescription pain medicine may cause constipation. Ask your healthcare provider how to prevent or treat constipation.   •Take your medicine as directed. Contact your healthcare provider if you think your medicine is not helping or if you have side effects. Tell him or her if you are allergic to any medicine. Keep a list of the medicines, vitamins, and herbs you take. Include the amounts, and when and why you take them. Bring the list or the pill bottles to follow-up visits. Carry your medicine list with you in case of an emergency.  How to manage your back pain:   •Apply ice on your back for 15 to 20 minutes every hour or as directed. Use an ice pack, or put crushed ice in a plastic bag. Cover it with a towel before you apply it to your skin. Ice helps prevent tissue damage and decreases pain.  •Apply heat on your back for 20 to 30 minutes every 2 hours for as many days as directed. Heat helps decrease pain and muscle spasms.  •Stay active as much as you can without causing more pain. Bed rest could make your back pain worse. Avoid heavy lifting until your pain is gone.  •Go to physical therapy as directed. A physical therapist can teach you exercises to help improve movement and strength, and to decrease pain.  Follow up with your healthcare provider in 2 weeks, or as directed: Write down your questions so you remember to ask them during your visits.

## 2023-10-07 NOTE — ED PROVIDER NOTE - CONSTITUTIONAL, MLM
n/a normal... Well appearing, awake, alert, oriented to person, place, time/situation and in no apparent distress.

## 2023-10-12 ENCOUNTER — APPOINTMENT (OUTPATIENT)
Dept: OBGYN | Facility: CLINIC | Age: 28
End: 2023-10-12
Payer: MEDICAID

## 2023-10-12 ENCOUNTER — NON-APPOINTMENT (OUTPATIENT)
Age: 28
End: 2023-10-12

## 2023-10-12 VITALS
WEIGHT: 143 LBS | SYSTOLIC BLOOD PRESSURE: 120 MMHG | DIASTOLIC BLOOD PRESSURE: 70 MMHG | BODY MASS INDEX: 28.83 KG/M2 | OXYGEN SATURATION: 98 % | HEIGHT: 59 IN | HEART RATE: 103 BPM | TEMPERATURE: 98.4 F

## 2023-10-12 DIAGNOSIS — Z98.890 OTHER SPECIFIED POSTPROCEDURAL STATES: ICD-10-CM

## 2023-10-12 PROCEDURE — 99212 OFFICE O/P EST SF 10 MIN: CPT

## 2023-10-14 LAB — HCG UR QL: NEGATIVE

## 2023-10-24 ENCOUNTER — APPOINTMENT (OUTPATIENT)
Dept: FAMILY MEDICINE | Facility: CLINIC | Age: 28
End: 2023-10-24
Payer: MEDICAID

## 2023-10-24 VITALS
BODY MASS INDEX: 28.02 KG/M2 | DIASTOLIC BLOOD PRESSURE: 78 MMHG | HEIGHT: 59 IN | TEMPERATURE: 98.7 F | OXYGEN SATURATION: 99 % | SYSTOLIC BLOOD PRESSURE: 119 MMHG | RESPIRATION RATE: 16 BRPM | HEART RATE: 103 BPM | WEIGHT: 139 LBS

## 2023-10-24 DIAGNOSIS — Z00.00 ENCOUNTER FOR GENERAL ADULT MEDICAL EXAMINATION W/OUT ABNORMAL FINDINGS: ICD-10-CM

## 2023-10-24 PROBLEM — J45.909 UNSPECIFIED ASTHMA, UNCOMPLICATED: Chronic | Status: ACTIVE | Noted: 2018-06-29

## 2023-10-24 PROCEDURE — 99395 PREV VISIT EST AGE 18-39: CPT

## 2023-11-09 ENCOUNTER — OUTPATIENT (OUTPATIENT)
Dept: OUTPATIENT SERVICES | Facility: HOSPITAL | Age: 28
LOS: 1 days | End: 2023-11-09
Payer: MEDICAID

## 2023-11-09 DIAGNOSIS — J45.909 UNSPECIFIED ASTHMA, UNCOMPLICATED: ICD-10-CM

## 2023-11-09 DIAGNOSIS — Z98.890 OTHER SPECIFIED POSTPROCEDURAL STATES: Chronic | ICD-10-CM

## 2023-11-09 DIAGNOSIS — Z90.89 ACQUIRED ABSENCE OF OTHER ORGANS: Chronic | ICD-10-CM

## 2023-11-09 PROCEDURE — 94729 DIFFUSING CAPACITY: CPT

## 2023-11-09 PROCEDURE — 94726 PLETHYSMOGRAPHY LUNG VOLUMES: CPT | Mod: 26

## 2023-11-09 PROCEDURE — 94726 PLETHYSMOGRAPHY LUNG VOLUMES: CPT

## 2023-11-09 PROCEDURE — 94010 BREATHING CAPACITY TEST: CPT | Mod: 26

## 2023-11-09 PROCEDURE — 94729 DIFFUSING CAPACITY: CPT | Mod: 26

## 2023-11-09 PROCEDURE — 94060 EVALUATION OF WHEEZING: CPT

## 2023-11-20 ENCOUNTER — NON-APPOINTMENT (OUTPATIENT)
Age: 28
End: 2023-11-20

## 2023-11-21 ENCOUNTER — RX CHANGE (OUTPATIENT)
Age: 28
End: 2023-11-21

## 2023-11-21 RX ORDER — BECLOMETHASONE DIPROPIONATE HFA 80 UG/1
80 AEROSOL, METERED RESPIRATORY (INHALATION)
Qty: 10.6 | Refills: 3 | Status: DISCONTINUED | COMMUNITY
Start: 2019-02-15 | End: 2023-11-21

## 2023-11-21 RX ORDER — BECLOMETHASONE DIPROPIONATE HFA 80 UG/1
80 AEROSOL, METERED RESPIRATORY (INHALATION)
Qty: 10.6 | Refills: 3 | Status: DISCONTINUED | COMMUNITY
Start: 2023-11-21 | End: 2023-11-21

## 2023-12-30 ENCOUNTER — NON-APPOINTMENT (OUTPATIENT)
Age: 28
End: 2023-12-30

## 2023-12-31 ENCOUNTER — EMERGENCY (EMERGENCY)
Facility: HOSPITAL | Age: 28
LOS: 1 days | Discharge: HOME | End: 2023-12-31
Attending: EMERGENCY MEDICINE | Admitting: EMERGENCY MEDICINE
Payer: MEDICAID

## 2023-12-31 VITALS
OXYGEN SATURATION: 100 % | DIASTOLIC BLOOD PRESSURE: 74 MMHG | HEIGHT: 60 IN | SYSTOLIC BLOOD PRESSURE: 111 MMHG | WEIGHT: 138.89 LBS | HEART RATE: 97 BPM | RESPIRATION RATE: 15 BRPM | TEMPERATURE: 97 F

## 2023-12-31 DIAGNOSIS — Z98.890 OTHER SPECIFIED POSTPROCEDURAL STATES: Chronic | ICD-10-CM

## 2023-12-31 DIAGNOSIS — Z90.89 ACQUIRED ABSENCE OF OTHER ORGANS: Chronic | ICD-10-CM

## 2023-12-31 LAB
ALBUMIN SERPL ELPH-MCNC: 3.4 G/DL — SIGNIFICANT CHANGE UP (ref 3.3–5)
ALBUMIN SERPL ELPH-MCNC: 3.4 G/DL — SIGNIFICANT CHANGE UP (ref 3.3–5)
ALP SERPL-CCNC: 52 U/L — SIGNIFICANT CHANGE UP (ref 40–120)
ALP SERPL-CCNC: 52 U/L — SIGNIFICANT CHANGE UP (ref 40–120)
ALT FLD-CCNC: 20 U/L — SIGNIFICANT CHANGE UP (ref 10–45)
ALT FLD-CCNC: 20 U/L — SIGNIFICANT CHANGE UP (ref 10–45)
ANION GAP SERPL CALC-SCNC: 13 MMOL/L — SIGNIFICANT CHANGE UP (ref 5–17)
ANION GAP SERPL CALC-SCNC: 13 MMOL/L — SIGNIFICANT CHANGE UP (ref 5–17)
APPEARANCE UR: ABNORMAL
APPEARANCE UR: ABNORMAL
AST SERPL-CCNC: 9 U/L — LOW (ref 10–40)
AST SERPL-CCNC: 9 U/L — LOW (ref 10–40)
BACTERIA # UR AUTO: ABNORMAL /HPF
BACTERIA # UR AUTO: ABNORMAL /HPF
BASOPHILS # BLD AUTO: 0.05 K/UL — SIGNIFICANT CHANGE UP (ref 0–0.2)
BASOPHILS # BLD AUTO: 0.05 K/UL — SIGNIFICANT CHANGE UP (ref 0–0.2)
BASOPHILS NFR BLD AUTO: 0.6 % — SIGNIFICANT CHANGE UP (ref 0–2)
BASOPHILS NFR BLD AUTO: 0.6 % — SIGNIFICANT CHANGE UP (ref 0–2)
BILIRUB SERPL-MCNC: 0.6 MG/DL — SIGNIFICANT CHANGE UP (ref 0.2–1.2)
BILIRUB SERPL-MCNC: 0.6 MG/DL — SIGNIFICANT CHANGE UP (ref 0.2–1.2)
BILIRUB UR-MCNC: NEGATIVE — SIGNIFICANT CHANGE UP
BILIRUB UR-MCNC: NEGATIVE — SIGNIFICANT CHANGE UP
BUN SERPL-MCNC: 8 MG/DL — SIGNIFICANT CHANGE UP (ref 7–23)
BUN SERPL-MCNC: 8 MG/DL — SIGNIFICANT CHANGE UP (ref 7–23)
CALCIUM SERPL-MCNC: 8.9 MG/DL — SIGNIFICANT CHANGE UP (ref 8.4–10.5)
CALCIUM SERPL-MCNC: 8.9 MG/DL — SIGNIFICANT CHANGE UP (ref 8.4–10.5)
CHLORIDE SERPL-SCNC: 103 MMOL/L — SIGNIFICANT CHANGE UP (ref 96–108)
CHLORIDE SERPL-SCNC: 103 MMOL/L — SIGNIFICANT CHANGE UP (ref 96–108)
CO2 SERPL-SCNC: 24 MMOL/L — SIGNIFICANT CHANGE UP (ref 22–31)
CO2 SERPL-SCNC: 24 MMOL/L — SIGNIFICANT CHANGE UP (ref 22–31)
COLOR SPEC: YELLOW — SIGNIFICANT CHANGE UP
COLOR SPEC: YELLOW — SIGNIFICANT CHANGE UP
COMMENT - URINE: SIGNIFICANT CHANGE UP
COMMENT - URINE: SIGNIFICANT CHANGE UP
CREAT SERPL-MCNC: 0.78 MG/DL — SIGNIFICANT CHANGE UP (ref 0.5–1.3)
CREAT SERPL-MCNC: 0.78 MG/DL — SIGNIFICANT CHANGE UP (ref 0.5–1.3)
DIFF PNL FLD: NEGATIVE — SIGNIFICANT CHANGE UP
DIFF PNL FLD: NEGATIVE — SIGNIFICANT CHANGE UP
EGFR: 107 ML/MIN/1.73M2 — SIGNIFICANT CHANGE UP
EGFR: 107 ML/MIN/1.73M2 — SIGNIFICANT CHANGE UP
EOSINOPHIL # BLD AUTO: 0.01 K/UL — SIGNIFICANT CHANGE UP (ref 0–0.5)
EOSINOPHIL # BLD AUTO: 0.01 K/UL — SIGNIFICANT CHANGE UP (ref 0–0.5)
EOSINOPHIL NFR BLD AUTO: 0.1 % — SIGNIFICANT CHANGE UP (ref 0–6)
EOSINOPHIL NFR BLD AUTO: 0.1 % — SIGNIFICANT CHANGE UP (ref 0–6)
EPI CELLS # UR: 2 — SIGNIFICANT CHANGE UP
EPI CELLS # UR: 2 — SIGNIFICANT CHANGE UP
GLUCOSE SERPL-MCNC: 108 MG/DL — HIGH (ref 70–99)
GLUCOSE SERPL-MCNC: 108 MG/DL — HIGH (ref 70–99)
GLUCOSE UR QL: NEGATIVE MG/DL — SIGNIFICANT CHANGE UP
GLUCOSE UR QL: NEGATIVE MG/DL — SIGNIFICANT CHANGE UP
HCT VFR BLD CALC: 37.9 % — SIGNIFICANT CHANGE UP (ref 34.5–45)
HCT VFR BLD CALC: 37.9 % — SIGNIFICANT CHANGE UP (ref 34.5–45)
HGB BLD-MCNC: 12.8 G/DL — SIGNIFICANT CHANGE UP (ref 11.5–15.5)
HGB BLD-MCNC: 12.8 G/DL — SIGNIFICANT CHANGE UP (ref 11.5–15.5)
IMM GRANULOCYTES NFR BLD AUTO: 0.6 % — SIGNIFICANT CHANGE UP (ref 0–0.9)
IMM GRANULOCYTES NFR BLD AUTO: 0.6 % — SIGNIFICANT CHANGE UP (ref 0–0.9)
KETONES UR-MCNC: NEGATIVE MG/DL — SIGNIFICANT CHANGE UP
KETONES UR-MCNC: NEGATIVE MG/DL — SIGNIFICANT CHANGE UP
LEUKOCYTE ESTERASE UR-ACNC: NEGATIVE — SIGNIFICANT CHANGE UP
LEUKOCYTE ESTERASE UR-ACNC: NEGATIVE — SIGNIFICANT CHANGE UP
LIDOCAIN IGE QN: 28 U/L — SIGNIFICANT CHANGE UP (ref 16–77)
LIDOCAIN IGE QN: 28 U/L — SIGNIFICANT CHANGE UP (ref 16–77)
LYMPHOCYTES # BLD AUTO: 1.14 K/UL — SIGNIFICANT CHANGE UP (ref 1–3.3)
LYMPHOCYTES # BLD AUTO: 1.14 K/UL — SIGNIFICANT CHANGE UP (ref 1–3.3)
LYMPHOCYTES # BLD AUTO: 13.1 % — SIGNIFICANT CHANGE UP (ref 13–44)
LYMPHOCYTES # BLD AUTO: 13.1 % — SIGNIFICANT CHANGE UP (ref 13–44)
MCHC RBC-ENTMCNC: 30.1 PG — SIGNIFICANT CHANGE UP (ref 27–34)
MCHC RBC-ENTMCNC: 30.1 PG — SIGNIFICANT CHANGE UP (ref 27–34)
MCHC RBC-ENTMCNC: 33.8 GM/DL — SIGNIFICANT CHANGE UP (ref 32–36)
MCHC RBC-ENTMCNC: 33.8 GM/DL — SIGNIFICANT CHANGE UP (ref 32–36)
MCV RBC AUTO: 89.2 FL — SIGNIFICANT CHANGE UP (ref 80–100)
MCV RBC AUTO: 89.2 FL — SIGNIFICANT CHANGE UP (ref 80–100)
MONOCYTES # BLD AUTO: 0.35 K/UL — SIGNIFICANT CHANGE UP (ref 0–0.9)
MONOCYTES # BLD AUTO: 0.35 K/UL — SIGNIFICANT CHANGE UP (ref 0–0.9)
MONOCYTES NFR BLD AUTO: 4 % — SIGNIFICANT CHANGE UP (ref 2–14)
MONOCYTES NFR BLD AUTO: 4 % — SIGNIFICANT CHANGE UP (ref 2–14)
NEUTROPHILS # BLD AUTO: 7.1 K/UL — SIGNIFICANT CHANGE UP (ref 1.8–7.4)
NEUTROPHILS # BLD AUTO: 7.1 K/UL — SIGNIFICANT CHANGE UP (ref 1.8–7.4)
NEUTROPHILS NFR BLD AUTO: 81.6 % — HIGH (ref 43–77)
NEUTROPHILS NFR BLD AUTO: 81.6 % — HIGH (ref 43–77)
NITRITE UR-MCNC: NEGATIVE — SIGNIFICANT CHANGE UP
NITRITE UR-MCNC: NEGATIVE — SIGNIFICANT CHANGE UP
NRBC # BLD: 0 /100 WBCS — SIGNIFICANT CHANGE UP (ref 0–0)
NRBC # BLD: 0 /100 WBCS — SIGNIFICANT CHANGE UP (ref 0–0)
PH UR: 5 — SIGNIFICANT CHANGE UP (ref 5–8)
PH UR: 5 — SIGNIFICANT CHANGE UP (ref 5–8)
PLATELET # BLD AUTO: 337 K/UL — SIGNIFICANT CHANGE UP (ref 150–400)
PLATELET # BLD AUTO: 337 K/UL — SIGNIFICANT CHANGE UP (ref 150–400)
POTASSIUM SERPL-MCNC: 3.7 MMOL/L — SIGNIFICANT CHANGE UP (ref 3.5–5.3)
POTASSIUM SERPL-MCNC: 3.7 MMOL/L — SIGNIFICANT CHANGE UP (ref 3.5–5.3)
POTASSIUM SERPL-SCNC: 3.7 MMOL/L — SIGNIFICANT CHANGE UP (ref 3.5–5.3)
POTASSIUM SERPL-SCNC: 3.7 MMOL/L — SIGNIFICANT CHANGE UP (ref 3.5–5.3)
PROT SERPL-MCNC: 7.3 G/DL — SIGNIFICANT CHANGE UP (ref 6–8.3)
PROT SERPL-MCNC: 7.3 G/DL — SIGNIFICANT CHANGE UP (ref 6–8.3)
PROT UR-MCNC: NEGATIVE MG/DL — SIGNIFICANT CHANGE UP
PROT UR-MCNC: NEGATIVE MG/DL — SIGNIFICANT CHANGE UP
RBC # BLD: 4.25 M/UL — SIGNIFICANT CHANGE UP (ref 3.8–5.2)
RBC # BLD: 4.25 M/UL — SIGNIFICANT CHANGE UP (ref 3.8–5.2)
RBC # FLD: 12.4 % — SIGNIFICANT CHANGE UP (ref 10.3–14.5)
RBC # FLD: 12.4 % — SIGNIFICANT CHANGE UP (ref 10.3–14.5)
RBC CASTS # UR COMP ASSIST: 0 /HPF — SIGNIFICANT CHANGE UP (ref 0–4)
RBC CASTS # UR COMP ASSIST: 0 /HPF — SIGNIFICANT CHANGE UP (ref 0–4)
SODIUM SERPL-SCNC: 140 MMOL/L — SIGNIFICANT CHANGE UP (ref 135–145)
SODIUM SERPL-SCNC: 140 MMOL/L — SIGNIFICANT CHANGE UP (ref 135–145)
SP GR SPEC: 1.02 — SIGNIFICANT CHANGE UP (ref 1–1.03)
SP GR SPEC: 1.02 — SIGNIFICANT CHANGE UP (ref 1–1.03)
UROBILINOGEN FLD QL: 0.2 MG/DL — SIGNIFICANT CHANGE UP (ref 0.2–1)
UROBILINOGEN FLD QL: 0.2 MG/DL — SIGNIFICANT CHANGE UP (ref 0.2–1)
WBC # BLD: 8.7 K/UL — SIGNIFICANT CHANGE UP (ref 3.8–10.5)
WBC # BLD: 8.7 K/UL — SIGNIFICANT CHANGE UP (ref 3.8–10.5)
WBC # FLD AUTO: 8.7 K/UL — SIGNIFICANT CHANGE UP (ref 3.8–10.5)
WBC # FLD AUTO: 8.7 K/UL — SIGNIFICANT CHANGE UP (ref 3.8–10.5)
WBC UR QL: 1 /HPF — SIGNIFICANT CHANGE UP (ref 0–5)
WBC UR QL: 1 /HPF — SIGNIFICANT CHANGE UP (ref 0–5)

## 2023-12-31 PROCEDURE — 74176 CT ABD & PELVIS W/O CONTRAST: CPT | Mod: MA

## 2023-12-31 PROCEDURE — 83690 ASSAY OF LIPASE: CPT

## 2023-12-31 PROCEDURE — 99284 EMERGENCY DEPT VISIT MOD MDM: CPT

## 2023-12-31 PROCEDURE — 99284 EMERGENCY DEPT VISIT MOD MDM: CPT | Mod: 25

## 2023-12-31 PROCEDURE — 85025 COMPLETE CBC W/AUTO DIFF WBC: CPT

## 2023-12-31 PROCEDURE — 81001 URINALYSIS AUTO W/SCOPE: CPT

## 2023-12-31 PROCEDURE — 87086 URINE CULTURE/COLONY COUNT: CPT

## 2023-12-31 PROCEDURE — 74176 CT ABD & PELVIS W/O CONTRAST: CPT | Mod: 26,MA

## 2023-12-31 PROCEDURE — 80053 COMPREHEN METABOLIC PANEL: CPT

## 2023-12-31 PROCEDURE — 36415 COLL VENOUS BLD VENIPUNCTURE: CPT

## 2023-12-31 PROCEDURE — 96361 HYDRATE IV INFUSION ADD-ON: CPT

## 2023-12-31 PROCEDURE — 96374 THER/PROPH/DIAG INJ IV PUSH: CPT

## 2023-12-31 PROCEDURE — 81025 URINE PREGNANCY TEST: CPT

## 2023-12-31 PROCEDURE — 96372 THER/PROPH/DIAG INJ SC/IM: CPT | Mod: XU

## 2023-12-31 RX ORDER — KETOROLAC TROMETHAMINE 30 MG/ML
1 SYRINGE (ML) INJECTION
Qty: 12 | Refills: 0
Start: 2023-12-31 | End: 2024-01-02

## 2023-12-31 RX ORDER — ONDANSETRON 8 MG/1
4 TABLET, FILM COATED ORAL ONCE
Refills: 0 | Status: COMPLETED | OUTPATIENT
Start: 2023-12-31 | End: 2023-12-31

## 2023-12-31 RX ORDER — SODIUM CHLORIDE 9 MG/ML
1000 INJECTION INTRAMUSCULAR; INTRAVENOUS; SUBCUTANEOUS ONCE
Refills: 0 | Status: COMPLETED | OUTPATIENT
Start: 2023-12-31 | End: 2023-12-31

## 2023-12-31 RX ORDER — KETOROLAC TROMETHAMINE 30 MG/ML
15 SYRINGE (ML) INJECTION ONCE
Refills: 0 | Status: DISCONTINUED | OUTPATIENT
Start: 2023-12-31 | End: 2023-12-31

## 2023-12-31 RX ORDER — CYCLOBENZAPRINE HYDROCHLORIDE 10 MG/1
1 TABLET, FILM COATED ORAL
Qty: 21 | Refills: 0
Start: 2023-12-31 | End: 2024-01-06

## 2023-12-31 RX ORDER — KETOROLAC TROMETHAMINE 30 MG/ML
30 SYRINGE (ML) INJECTION ONCE
Refills: 0 | Status: DISCONTINUED | OUTPATIENT
Start: 2023-12-31 | End: 2023-12-31

## 2023-12-31 RX ADMIN — Medication 15 MILLIGRAM(S): at 09:35

## 2023-12-31 RX ADMIN — Medication 15 MILLIGRAM(S): at 09:50

## 2023-12-31 RX ADMIN — SODIUM CHLORIDE 1000 MILLILITER(S): 9 INJECTION INTRAMUSCULAR; INTRAVENOUS; SUBCUTANEOUS at 09:11

## 2023-12-31 RX ADMIN — ONDANSETRON 4 MILLIGRAM(S): 8 TABLET, FILM COATED ORAL at 09:11

## 2023-12-31 RX ADMIN — SODIUM CHLORIDE 1000 MILLILITER(S): 9 INJECTION INTRAMUSCULAR; INTRAVENOUS; SUBCUTANEOUS at 10:11

## 2023-12-31 NOTE — ED PROVIDER NOTE - NSFOLLOWUPINSTRUCTIONS_ED_ALL_ED_FT
-- You should update your primary care physician on your Emergency Department visit and follow up with them.  If you do not have a physician or have difficulty following up, please call: [phone] to obtain a [facility] doctor or specialist who can provide follow up.    -- Take ibuprofen 400 mg every 6 hours with food, as needed for pain    -- Take tylenol 650 mg every 4 hours, as needed for pain    -- Return to the ER for worsening or persistent symptoms, and/or ANY NEW OR CONCERNING SYMPTOMS. -- You should update your primary care physician on your Emergency Department visit and follow up with them.  If you do not have a physician or have difficulty following up, please call: [phone] to obtain a [facility] doctor or specialist who can provide follow up.    -- Take ibuprofen 400 mg every 6 hours with food, as needed for pain    -- Take tylenol 650 mg every 4 hours, as needed for pain    -- Return to the ER for worsening or persistent symptoms, and/or ANY NEW OR CONCERNING SYMPTOMS.

## 2023-12-31 NOTE — ED ADULT NURSE NOTE - NSFALLUNIVINTERV_ED_ALL_ED
Bed/Stretcher in lowest position, wheels locked, appropriate side rails in place/Call bell, personal items and telephone in reach/Instruct patient to call for assistance before getting out of bed/chair/stretcher/Non-slip footwear applied when patient is off stretcher/[place] to call system/Physically safe environment - no spills, clutter or unnecessary equipment/Purposeful proactive rounding/Room/bathroom lighting operational, light cord in reach Bed/Stretcher in lowest position, wheels locked, appropriate side rails in place/Call bell, personal items and telephone in reach/Instruct patient to call for assistance before getting out of bed/chair/stretcher/Non-slip footwear applied when patient is off stretcher/[place] to call system/Physically safe environment - no spills, clutter or unnecessary equipment/Purposeful proactive rounding/Room/bathroom lighting operational, light cord in reach

## 2023-12-31 NOTE — ED PROVIDER NOTE - CLINICAL SUMMARY MEDICAL DECISION MAKING FREE TEXT BOX
pt with R flank pain pt with R flank pain and a few episodes of vomiting, ED work up unremarkable, pt likely with msk back pain.

## 2023-12-31 NOTE — ED PROVIDER NOTE - PATIENT PORTAL LINK FT
You can access the FollowMyHealth Patient Portal offered by [facility] by registering at the following website: [email or web address]. By joining [employer]’s FollowMyHealth portal, you will also be able to view your health information using other applications (apps) compatible with our system. You can access the FollowMyHealth Patient Portal offered by [facility] by registering at the following website: [email or web address]. By joining [employer]’s FollowMyHealth portal, you will also be able to view your health information using other applications (apps) compatible with our system.

## 2023-12-31 NOTE — ED PROVIDER NOTE - OBJECTIVE STATEMENT
pt states that she woke up with R flank pain and is not able to get comfortable, tried to take one motrin at home but threw it up, denies nausea, at times has trouble swallowing pills, pt has no medical problems, denies any dysuria or frequency. no h/o renal colic. pt is unclear on whether pain is worse w movement.

## 2024-01-02 LAB
CULTURE RESULTS: SIGNIFICANT CHANGE UP
CULTURE RESULTS: SIGNIFICANT CHANGE UP
SPECIMEN SOURCE: SIGNIFICANT CHANGE UP
SPECIMEN SOURCE: SIGNIFICANT CHANGE UP

## 2024-01-24 ENCOUNTER — APPOINTMENT (OUTPATIENT)
Dept: PULMONOLOGY | Facility: CLINIC | Age: 29
End: 2024-01-24
Payer: MEDICAID

## 2024-01-24 VITALS
TEMPERATURE: 97.4 F | BODY MASS INDEX: 28.02 KG/M2 | WEIGHT: 139 LBS | OXYGEN SATURATION: 97 % | SYSTOLIC BLOOD PRESSURE: 116 MMHG | HEIGHT: 59 IN | HEART RATE: 82 BPM | DIASTOLIC BLOOD PRESSURE: 70 MMHG

## 2024-01-24 PROCEDURE — 99214 OFFICE O/P EST MOD 30 MIN: CPT

## 2024-01-24 RX ORDER — FLUTICASONE PROPIONATE 110 UG/1
110 AEROSOL, METERED RESPIRATORY (INHALATION) TWICE DAILY
Qty: 3 | Refills: 3 | Status: DISCONTINUED | COMMUNITY
Start: 2023-11-21 | End: 2024-01-24

## 2024-01-24 NOTE — PROCEDURE
[FreeTextEntry1] : FT 11/9/2023 Fair efforts FVC FEV1 and FEV1/FVC are normal FEF 25-75% normal MVV is reduced  Lung volumes by body plethysmography reveal normal TLC and RV/TLC  DLCo and DLCO/Va are normal Airway restistance is increased  Impression Likely normal spirometry, TLC, and DLCO Reduced MVV maybe due to poor efforts or neuromuscular weakness Likely increased airway restistance Recommend clinical correlation  Signatures

## 2024-01-24 NOTE — ASSESSMENT
[FreeTextEntry1] : 29y/o female  1- asthma moderate persistent  well controlled  2- poor MDI use with nasal spacer 3- post covid with increase in sensitiviites 4- vaccination per primary  Recommendations 1- decrease  trial to   fluticasone   one  inh  bid            (  flovent     discontinued ) 2- cxr above done no inifltrate 3- tolerating singulair  4- allergy pills/ HEPA air filter  side effects of med reviewed with patient and her mother  counseling and  PFT reviewed  return in six months answered questions and   agreement on plan

## 2024-01-24 NOTE — HISTORY OF PRESENT ILLNESS
[Never] : never [TextBox_4] : 9/20/2023 29y/o   female never smoker     no pets   asthma  moderate   persistent    trigger   allergies/    uri -dx  10 y/o       on Montelukast    tolerated   -went  away      triggered asthma  - qvar   bid  rinse      - albuterol MDI   as needed     since  covid    -   dx   9/2/2023     Paxlovid  more albuterol use - some cough  since covid   1/24/2024 29y/o female  never smoker   moderate asthma f/u -reviewed pft - doign well on flovent two inh q 12    not using her albuterol  -n ocough no sob

## 2024-01-24 NOTE — REVIEW OF SYSTEMS
[Nasal Congestion] : nasal congestion [Fever] : no fever [Recent Wt Gain (___ Lbs)] : ~T no recent weight gain [Chills] : no chills [Recent Wt Loss (___ Lbs)] : ~T no recent weight loss [Sinus Problems] : no sinus problems [Cough] : no cough [Hemoptysis] : no hemoptysis [Chest Tightness] : no chest tightness [Sputum] : no sputum [Dyspnea] : no dyspnea [Wheezing] : no wheezing [SOB on Exertion] : no sob on exertion [Chest Discomfort] : no chest discomfort [Palpitations] : no palpitations [GERD] : no gerd [Abdominal Pain] : no abdominal pain [Nausea] : no nausea [Vomiting] : no vomiting [Dysphagia] : no dysphagia [Bleeding] : no bleeding [Myalgias] : no myalgias [Chronic Pain] : no chronic pain [Rash] : no rash [Blood Transfusion] : no blood transfusion [Clotting Disorder/ Frequent bleeding] : no clotting disorder/ frequent bleeding [Diabetes] : no diabetes [Thyroid Problem] : no thyroid problem [Obesity] : no obesity

## 2024-01-24 NOTE — PHYSICAL EXAM
[No Acute Distress] : no acute distress [No Deformities] : no deformities [Enlarged Base of the Tongue] : enlarged base of the tongue [IV] : Mallampati Class: IV [Normal Appearance] : normal appearance [No Neck Mass] : no neck mass [Normal Rate/Rhythm] : normal rate/rhythm [Normal S1, S2] : normal s1, s2 [No Murmurs] : no murmurs [No Resp Distress] : no resp distress [No Acc Muscle Use] : no acc muscle use [Clear to Auscultation Bilaterally] : clear to auscultation bilaterally [Benign] : benign [Not Tender] : not tender [No Masses] : no masses [Soft] : soft [No Hernias] : no hernias [Normal Bowel Sounds] : normal bowel sounds [Normal Gait] : normal gait [No Clubbing] : no clubbing [No Edema] : no edema [No Rash] : no rash [No Motor Deficits] : no motor deficits [Normal Affect] : normal affect [TextBox_2] : pleasant f no distress speaking fulli sentences n cough  [TextBox_11] : no lesion  moist

## 2024-01-30 NOTE — REASON FOR VISIT
Patient was tested over 5 years ago and peer patient was not conclusive and was told \"keep an eye on it.\"  Was waking up into an anxiety attack although patient states she does not have that anymore.   [Hay Fever] : hay fever [Asthma] : asthma [Routine Follow-Up] : a routine follow-up visit for

## 2024-02-17 ENCOUNTER — RX RENEWAL (OUTPATIENT)
Age: 29
End: 2024-02-17

## 2024-02-26 LAB
ALBUMIN SERPL ELPH-MCNC: 4.2 G/DL
ALP BLD-CCNC: 51 U/L
ALT SERPL-CCNC: 14 U/L
ANION GAP SERPL CALC-SCNC: 12 MMOL/L
APPEARANCE: CLEAR
AST SERPL-CCNC: 12 U/L
BASOPHILS # BLD AUTO: 0.07 K/UL
BASOPHILS NFR BLD AUTO: 1.1 %
BILIRUB SERPL-MCNC: 0.8 MG/DL
BILIRUBIN URINE: NEGATIVE
BLOOD URINE: NEGATIVE
BUN SERPL-MCNC: 7 MG/DL
CALCIUM SERPL-MCNC: 9 MG/DL
CHLORIDE SERPL-SCNC: 103 MMOL/L
CHOLEST SERPL-MCNC: 230 MG/DL
CO2 SERPL-SCNC: 22 MMOL/L
COLOR: YELLOW
CREAT SERPL-MCNC: 0.69 MG/DL
EGFR: 121 ML/MIN/1.73M2
EOSINOPHIL # BLD AUTO: 0.07 K/UL
EOSINOPHIL NFR BLD AUTO: 1.1 %
ESTIMATED AVERAGE GLUCOSE: 94 MG/DL
GLUCOSE QUALITATIVE U: NEGATIVE MG/DL
GLUCOSE SERPL-MCNC: 83 MG/DL
HBA1C MFR BLD HPLC: 4.9 %
HCT VFR BLD CALC: 37.9 %
HDLC SERPL-MCNC: 44 MG/DL
HGB BLD-MCNC: 12.7 G/DL
IMM GRANULOCYTES NFR BLD AUTO: 0.5 %
KETONES URINE: NEGATIVE MG/DL
LDLC SERPL CALC-MCNC: 165 MG/DL
LEUKOCYTE ESTERASE URINE: NEGATIVE
LYMPHOCYTES # BLD AUTO: 1.71 K/UL
LYMPHOCYTES NFR BLD AUTO: 26.8 %
MAN DIFF?: NORMAL
MCHC RBC-ENTMCNC: 29.9 PG
MCHC RBC-ENTMCNC: 33.5 GM/DL
MCV RBC AUTO: 89.2 FL
MONOCYTES # BLD AUTO: 0.4 K/UL
MONOCYTES NFR BLD AUTO: 6.3 %
NEUTROPHILS # BLD AUTO: 4.09 K/UL
NEUTROPHILS NFR BLD AUTO: 64.2 %
NITRITE URINE: NEGATIVE
NONHDLC SERPL-MCNC: 186 MG/DL
PH URINE: 7.5
PLATELET # BLD AUTO: 357 K/UL
POTASSIUM SERPL-SCNC: 4 MMOL/L
PROT SERPL-MCNC: 6.6 G/DL
PROTEIN URINE: NEGATIVE MG/DL
RBC # BLD: 4.25 M/UL
RBC # FLD: 12.8 %
SODIUM SERPL-SCNC: 137 MMOL/L
SPECIFIC GRAVITY URINE: 1.02
TRIGL SERPL-MCNC: 119 MG/DL
TSH SERPL-ACNC: 1.41 UIU/ML
UROBILINOGEN URINE: 0.2 MG/DL
WBC # FLD AUTO: 6.37 K/UL

## 2024-03-01 ENCOUNTER — TRANSCRIPTION ENCOUNTER (OUTPATIENT)
Age: 29
End: 2024-03-01

## 2024-03-22 RX ORDER — BUDESONIDE AND FORMOTEROL FUMARATE DIHYDRATE 80; 4.5 UG/1; UG/1
80-4.5 AEROSOL RESPIRATORY (INHALATION)
Qty: 1 | Refills: 5 | Status: ACTIVE | COMMUNITY
Start: 2024-03-22 | End: 1900-01-01

## 2024-03-22 RX ORDER — FLUTICASONE PROPIONATE 110 UG/1
110 AEROSOL, METERED RESPIRATORY (INHALATION) TWICE DAILY
Qty: 1 | Refills: 5 | Status: DISCONTINUED | COMMUNITY
End: 2024-03-22

## 2024-04-16 ENCOUNTER — APPOINTMENT (OUTPATIENT)
Dept: FAMILY MEDICINE | Facility: CLINIC | Age: 29
End: 2024-04-16
Payer: MEDICAID

## 2024-04-16 VITALS
HEIGHT: 59 IN | RESPIRATION RATE: 15 BRPM | BODY MASS INDEX: 29.64 KG/M2 | WEIGHT: 147 LBS | OXYGEN SATURATION: 99 % | TEMPERATURE: 97.1 F | DIASTOLIC BLOOD PRESSURE: 74 MMHG | SYSTOLIC BLOOD PRESSURE: 110 MMHG | HEART RATE: 98 BPM

## 2024-04-16 DIAGNOSIS — E78.00 PURE HYPERCHOLESTEROLEMIA, UNSPECIFIED: ICD-10-CM

## 2024-04-16 PROCEDURE — G2211 COMPLEX E/M VISIT ADD ON: CPT | Mod: NC,1L

## 2024-04-16 PROCEDURE — 99214 OFFICE O/P EST MOD 30 MIN: CPT

## 2024-04-16 RX ORDER — FLUTICASONE PROPIONATE 50 UG/1
50 SPRAY, METERED NASAL DAILY
Qty: 1 | Refills: 3 | Status: ACTIVE | COMMUNITY
Start: 2019-02-15 | End: 1900-01-01

## 2024-04-17 LAB
ALBUMIN SERPL ELPH-MCNC: 4.4 G/DL
ALP BLD-CCNC: 51 U/L
ALT SERPL-CCNC: 12 U/L
ANION GAP SERPL CALC-SCNC: 12 MMOL/L
APPEARANCE: CLEAR
AST SERPL-CCNC: 14 U/L
BACTERIA: NEGATIVE /HPF
BASOPHILS # BLD AUTO: 0.05 K/UL
BASOPHILS NFR BLD AUTO: 0.8 %
BILIRUB SERPL-MCNC: 0.9 MG/DL
BILIRUBIN URINE: NEGATIVE
BLOOD URINE: NEGATIVE
BUN SERPL-MCNC: 8 MG/DL
CALCIUM SERPL-MCNC: 9 MG/DL
CAST: 0 /LPF
CHLORIDE SERPL-SCNC: 104 MMOL/L
CHOLEST SERPL-MCNC: 242 MG/DL
CO2 SERPL-SCNC: 22 MMOL/L
COLOR: YELLOW
CREAT SERPL-MCNC: 0.75 MG/DL
CREAT SPEC-SCNC: 223 MG/DL
EGFR: 111 ML/MIN/1.73M2
EOSINOPHIL # BLD AUTO: 0.12 K/UL
EOSINOPHIL NFR BLD AUTO: 2 %
EPITHELIAL CELLS: 5 /HPF
GLUCOSE QUALITATIVE U: NEGATIVE MG/DL
GLUCOSE SERPL-MCNC: 84 MG/DL
HCT VFR BLD CALC: 38.5 %
HDLC SERPL-MCNC: 49 MG/DL
HGB BLD-MCNC: 12.7 G/DL
IMM GRANULOCYTES NFR BLD AUTO: 0.3 %
KETONES URINE: ABNORMAL MG/DL
LDLC SERPL CALC-MCNC: 168 MG/DL
LEUKOCYTE ESTERASE URINE: ABNORMAL
LYMPHOCYTES # BLD AUTO: 1.51 K/UL
LYMPHOCYTES NFR BLD AUTO: 25.5 %
MAN DIFF?: NORMAL
MCHC RBC-ENTMCNC: 29.7 PG
MCHC RBC-ENTMCNC: 33 GM/DL
MCV RBC AUTO: 90.2 FL
MICROALBUMIN 24H UR DL<=1MG/L-MCNC: <1.2 MG/DL
MICROALBUMIN/CREAT 24H UR-RTO: NORMAL MG/G
MICROSCOPIC-UA: NORMAL
MONOCYTES # BLD AUTO: 0.35 K/UL
MONOCYTES NFR BLD AUTO: 5.9 %
NEUTROPHILS # BLD AUTO: 3.87 K/UL
NEUTROPHILS NFR BLD AUTO: 65.5 %
NITRITE URINE: NEGATIVE
NONHDLC SERPL-MCNC: 193 MG/DL
PH URINE: 7
PLATELET # BLD AUTO: 350 K/UL
POTASSIUM SERPL-SCNC: 4.2 MMOL/L
PROT SERPL-MCNC: 6.9 G/DL
PROTEIN URINE: NEGATIVE MG/DL
RBC # BLD: 4.27 M/UL
RBC # FLD: 13.2 %
RED BLOOD CELLS URINE: 1 /HPF
SODIUM SERPL-SCNC: 138 MMOL/L
SPECIFIC GRAVITY URINE: 1.02
TRIGL SERPL-MCNC: 138 MG/DL
TSH SERPL-ACNC: 0.88 UIU/ML
UROBILINOGEN URINE: 0.2 MG/DL
WBC # FLD AUTO: 5.92 K/UL
WHITE BLOOD CELLS URINE: 0 /HPF

## 2024-04-18 ENCOUNTER — EMERGENCY (EMERGENCY)
Facility: HOSPITAL | Age: 29
LOS: 1 days | Discharge: HOME | End: 2024-04-18
Attending: EMERGENCY MEDICINE | Admitting: EMERGENCY MEDICINE
Payer: MEDICAID

## 2024-04-18 VITALS
OXYGEN SATURATION: 99 % | HEART RATE: 94 BPM | DIASTOLIC BLOOD PRESSURE: 62 MMHG | WEIGHT: 147.05 LBS | RESPIRATION RATE: 17 BRPM | TEMPERATURE: 97 F | SYSTOLIC BLOOD PRESSURE: 112 MMHG

## 2024-04-18 DIAGNOSIS — Z98.890 OTHER SPECIFIED POSTPROCEDURAL STATES: Chronic | ICD-10-CM

## 2024-04-18 DIAGNOSIS — Z90.89 ACQUIRED ABSENCE OF OTHER ORGANS: Chronic | ICD-10-CM

## 2024-04-18 PROCEDURE — 99283 EMERGENCY DEPT VISIT LOW MDM: CPT

## 2024-04-18 PROCEDURE — 96372 THER/PROPH/DIAG INJ SC/IM: CPT

## 2024-04-18 PROCEDURE — 99284 EMERGENCY DEPT VISIT MOD MDM: CPT

## 2024-04-18 RX ORDER — ONDANSETRON 8 MG/1
1 TABLET, FILM COATED ORAL
Qty: 1 | Refills: 0
Start: 2024-04-18 | End: 2024-04-20

## 2024-04-18 RX ORDER — ALBUTEROL 90 UG/1
2 AEROSOL, METERED ORAL
Refills: 0 | DISCHARGE

## 2024-04-18 RX ORDER — ONDANSETRON 8 MG/1
4 TABLET, FILM COATED ORAL ONCE
Refills: 0 | Status: COMPLETED | OUTPATIENT
Start: 2024-04-18 | End: 2024-04-18

## 2024-04-18 RX ORDER — METHOCARBAMOL 500 MG/1
1 TABLET, FILM COATED ORAL
Qty: 12 | Refills: 0
Start: 2024-04-18 | End: 2024-04-21

## 2024-04-18 RX ORDER — KETOROLAC TROMETHAMINE 30 MG/ML
30 SYRINGE (ML) INJECTION ONCE
Refills: 0 | Status: DISCONTINUED | OUTPATIENT
Start: 2024-04-18 | End: 2024-04-18

## 2024-04-18 RX ORDER — MONTELUKAST 4 MG/1
1 TABLET, CHEWABLE ORAL
Refills: 0 | DISCHARGE

## 2024-04-18 RX ORDER — IBUPROFEN 200 MG
1 TABLET ORAL
Qty: 16 | Refills: 0
Start: 2024-04-18 | End: 2024-04-21

## 2024-04-18 RX ORDER — LIDOCAINE 4 G/100G
1 CREAM TOPICAL ONCE
Refills: 0 | Status: COMPLETED | OUTPATIENT
Start: 2024-04-18 | End: 2024-04-18

## 2024-04-18 RX ORDER — LIDOCAINE 4 G/100G
1 CREAM TOPICAL
Qty: 3 | Refills: 0
Start: 2024-04-18 | End: 2024-04-22

## 2024-04-18 RX ADMIN — Medication 30 MILLIGRAM(S): at 09:00

## 2024-04-18 RX ADMIN — ONDANSETRON 4 MILLIGRAM(S): 8 TABLET, FILM COATED ORAL at 09:00

## 2024-04-18 RX ADMIN — LIDOCAINE 1 PATCH: 4 CREAM TOPICAL at 09:00

## 2024-04-18 NOTE — ED ADULT NURSE NOTE - PATIENT'S PREFERRED PRONOUN
Quality 431: Preventive Care And Screening: Unhealthy Alcohol Use - Screening: Patient screened for unhealthy alcohol use using a single question and scores less than 2 times per year Quality 226: Preventive Care And Screening: Tobacco Use: Screening And Cessation Intervention: Patient screened for tobacco use and is an ex/non-smoker Detail Level: Detailed Quality 130: Documentation Of Current Medications In The Medical Record: Current Medications Documented Her/She

## 2024-04-18 NOTE — ED PROVIDER NOTE - NSFOLLOWUPINSTRUCTIONS_ED_ALL_ED_FT
You have muscular strain.  We have given you an antinausea medication.  We have given you an injection therefore your next dose of ibuprofen or Motrin should not be until after 3 PM, if needed. We recommend an anti-inflammatory such as ibuprofen 600 mg every 6 hours as needed for the pain.   We would like for you to take a muscle relaxant, called methocarbamol 750 mg every 8 hours as needed for muscle tightness.  It was sent to your pharmacy.  These can be taken together safely. Follow-up with your primary medical doctor.  Return to the ER if there is an emergency.  Thank you for choosing [facility].

## 2024-04-18 NOTE — ED PROVIDER NOTE - CLINICAL SUMMARY MEDICAL DECISION MAKING FREE TEXT BOX
No significant past surgical history
28-year-old female presents to the emergency department reporting back pain.  Patient states that she was supposed to take 2 trips to take her bags or books and she took everything in 1 trip.  As result has strained her back.  Patient reporting pain to the right mid back just beneath the scapula.  Worse with movement.  Better with rest.  There was no fall or trauma.  No numbness or tingling.  No loss of function.  Patient states that due to pain, she usually has episodes of nausea.  She did not want to take ibuprofen at home on an empty stomach.  Came to the ED for evaluation and management because she states the last time she came for this, medication was given to her that helped.  Patient was given Toradol in the past.  No other reported symptoms.  Exam as stated. Muscular related pain.  Will provide anti-inflammatory and lidocaine patch.  Will give an antinausea medication and as advised patient to take the muscle relaxant when she gets home.  It will be sent to the pharmacy.  Mother at bedside.  All questions answered.  Stable for discharge.

## 2024-04-18 NOTE — ED PROVIDER NOTE - PATIENT PORTAL LINK FT
You can access the FollowMyHealth Patient Portal offered by [facility] by registering at the following website: [email or web address]. By joining [employer]’s FollowMyHealth portal, you will also be able to view your health information using other applications (apps) compatible with our system.

## 2024-04-18 NOTE — ED ADULT NURSE NOTE - OBJECTIVE STATEMENT
28 year old Female comes to ER with right upper back pain after lifting books yesterday. Patient reports similar pain to this previously when heavy lifting. Patient reports not being able to get in any comfortable position. No trauma noted to area, no bruising, redness or swelling. Took a warm shower but took no medication prior to arrival. Patient able to ambulate with steady gait noted. Alert and oriented, breathing spontaneous and unlabored, moving all extremities. Mother at bedside.

## 2024-04-18 NOTE — ED PROVIDER NOTE - PHYSICAL EXAMINATION
General:     NAD, well-nourished, well-appearing  Lungs:     CTA b/l  CVS:     RRR  Abd:     +BS, s/nt/nd  Ext:   no deformities   Point tender right parathoracic, muscular belly, just beneath right scapula. ROM of b/l upper extremities normal and full.   Skin: no rash  Neuro: AAOx3, no sensory/motor deficits  Normal gait.

## 2024-04-18 NOTE — ED ADULT TRIAGE NOTE - ARRIVAL FROM
Addendum  created 08/24/23 1356 by Sophie Contreras, [address] [address] edited, Orders acknowledged in Narrator
Home

## 2024-04-18 NOTE — ED PROVIDER NOTE - OBJECTIVE STATEMENT
28-year-old female presents to the emergency department reporting back pain.  Patient states that she was supposed to take 2 trips to take her bags or books and she took everything in 1 trip.  As result has strained her back.  Patient reporting pain to the right mid back just beneath the scapula.  Worse with movement.  Better with rest.  There was no fall or trauma.  No numbness or tingling.  No loss of function.  Patient states that due to pain, she usually has episodes of nausea.  She did not want to take ibuprofen at home on an empty stomach.  Came to the ED for evaluation and management because she states the last time she came for this, medication was given to her that helped.  Patient was given Toradol in the past.  No other reported symptoms.

## 2024-04-19 PROBLEM — E78.00 PURE HYPERCHOLESTEROLEMIA: Status: ACTIVE | Noted: 2023-08-01

## 2024-04-24 ENCOUNTER — APPOINTMENT (OUTPATIENT)
Dept: FAMILY MEDICINE | Facility: CLINIC | Age: 29
End: 2024-04-24
Payer: MEDICAID

## 2024-04-24 VITALS
TEMPERATURE: 97.1 F | RESPIRATION RATE: 16 BRPM | DIASTOLIC BLOOD PRESSURE: 86 MMHG | WEIGHT: 143 LBS | OXYGEN SATURATION: 99 % | BODY MASS INDEX: 28.83 KG/M2 | HEIGHT: 59 IN | HEART RATE: 103 BPM | SYSTOLIC BLOOD PRESSURE: 128 MMHG

## 2024-04-24 DIAGNOSIS — M54.50 LOW BACK PAIN, UNSPECIFIED: ICD-10-CM

## 2024-04-24 DIAGNOSIS — M54.6 PAIN IN THORACIC SPINE: ICD-10-CM

## 2024-04-24 DIAGNOSIS — J45.909 UNSPECIFIED ASTHMA, UNCOMPLICATED: ICD-10-CM

## 2024-04-24 DIAGNOSIS — L30.9 DERMATITIS, UNSPECIFIED: ICD-10-CM

## 2024-04-24 DIAGNOSIS — M62.830 MUSCLE SPASM OF BACK: ICD-10-CM

## 2024-04-24 DIAGNOSIS — E66.3 OVERWEIGHT: ICD-10-CM

## 2024-04-24 PROCEDURE — 99214 OFFICE O/P EST MOD 30 MIN: CPT

## 2024-04-24 RX ORDER — METHOCARBAMOL 750 MG/1
750 TABLET, FILM COATED ORAL
Qty: 30 | Refills: 1 | Status: ACTIVE | COMMUNITY
Start: 2024-04-24 | End: 1900-01-01

## 2024-04-24 RX ORDER — MELOXICAM 7.5 MG/1
7.5 TABLET ORAL TWICE DAILY
Qty: 28 | Refills: 1 | Status: ACTIVE | COMMUNITY
Start: 2024-04-24 | End: 1900-01-01

## 2024-04-24 NOTE — HISTORY OF PRESENT ILLNESS
[FreeTextEntry8] : Ms Romina Tay is a 29 yo female presents today with mother for symptoms of right upper back, medial scapula muscles. Pt denies any recent fall, injury, does reports was carrying heavy book bag up and down stair case. Pt was also evaluated in ER, at [facility], and was advised a likely muscle strain, prescribe ibuprofen, Robaxin and Lidoderm patch. Pt reports while using the medication was helping slightly, but now after medication finished pain recurring. Advised today will titrate medication to Mobic BID after meals, continue with Robaxin, and Lidoderm patch. Advised today will also provide a referral to physical therapy. If symptoms do not improve then follow up back.

## 2024-04-24 NOTE — REVIEW OF SYSTEMS
[Fever] : no fever [Chills] : no chills [Nasal Discharge] : no nasal discharge [Sore Throat] : sore throat [Chest Pain] : no chest pain [Shortness Of Breath] : no shortness of breath [Wheezing] : no wheezing [Cough] : cough [Dyspnea on Exertion] : no dyspnea on exertion [Abdominal Pain] : no abdominal pain [Dysuria] : no dysuria [Frequency] : no frequency [Joint Pain] : joint pain [Joint Stiffness] : no joint stiffness [Joint Swelling] : no joint swelling [Muscle Weakness] : no muscle weakness [Muscle Pain] : muscle pain [Back Pain] : back pain [Skin Rash] : no skin rash [Suicidal] : not suicidal [Insomnia] : no insomnia [Anxiety] : no anxiety [Negative] : Heme/Lymph

## 2024-04-24 NOTE — PHYSICAL EXAM
[No Acute Distress] : no acute distress [Well Nourished] : well nourished [EOMI] : extraocular movements intact [Supple] : supple [Clear to Auscultation] : lungs were clear to auscultation bilaterally [Normal S1, S2] : normal S1 and S2 [No Edema] : there was no peripheral edema [Non Tender] : non-tender [No Rash] : no rash [Normal Gait] : normal gait [Normal Affect] : the affect was normal [de-identified] : right medial scapula muscle hypertonicity, pain with rom

## 2024-04-24 NOTE — ASSESSMENT
[FreeTextEntry1] : Approximately 30 min of face to face time spent, reviewed chart, prior lab work, addressed various health concerns, ordered necessary medications Mobic and Robaxin, referral physical therapy. Answered all pt questions, coordinated care for patient.

## 2024-06-26 ENCOUNTER — NON-APPOINTMENT (OUTPATIENT)
Age: 29
End: 2024-06-26

## 2024-07-24 ENCOUNTER — APPOINTMENT (OUTPATIENT)
Dept: PULMONOLOGY | Facility: CLINIC | Age: 29
End: 2024-07-24
Payer: MEDICAID

## 2024-07-24 VITALS
OXYGEN SATURATION: 96 % | DIASTOLIC BLOOD PRESSURE: 75 MMHG | HEART RATE: 90 BPM | HEIGHT: 59 IN | TEMPERATURE: 97.5 F | BODY MASS INDEX: 29.03 KG/M2 | SYSTOLIC BLOOD PRESSURE: 122 MMHG | WEIGHT: 144 LBS

## 2024-07-24 DIAGNOSIS — J45.909 UNSPECIFIED ASTHMA, UNCOMPLICATED: ICD-10-CM

## 2024-07-24 PROCEDURE — 95012 NITRIC OXIDE EXP GAS DETER: CPT

## 2024-07-24 PROCEDURE — 99213 OFFICE O/P EST LOW 20 MIN: CPT | Mod: 25

## 2024-07-24 NOTE — HISTORY OF PRESENT ILLNESS
[Never] : never [TextBox_4] : 9/20/2023 29y/o   female never smoker     no pets   asthma  moderate   persistent    trigger   allergies/    uri -dx  10 y/o       on Montelukast    tolerated   -went  away      triggered asthma  - qvar   bid  rinse      - albuterol MDI   as needed     since  covid    -   dx   9/2/2023     Paxlovid  more albuterol use - some cough  since covid   1/24/2024 29y/o female  never smoker   moderate asthma f/u -reviewed pft - doign well on flovent two inh q 12    not using her albuterol  -n ocough no sob    7/24/2024 30y/o   female  never smoker  moderate  asthma   --  symbicort  - doign well   with  symbicort  prn  -

## 2024-07-24 NOTE — ASSESSMENT
[FreeTextEntry1] : 28y/o female  1- asthma moderate persistent  well controlled  2- poor MDI use with nasal spacer 3- post covid with increase in sensitivities 4- vaccination per primary  Recommendations 1-symbicort  one inha   q 12 prn  covered  2-  cxr  3- tolerating singular  side effects reviewed   4- allergy pills/ HEPA air filter 5- PFT   side effects of med reviewed with patient and her mother  counseling and  PFT reviewed  return in six months answered questions and   agreement on plan

## 2024-07-24 NOTE — PROCEDURE
[FreeTextEntry1] : 7/24/2024  NIOX  7 ppb   FT 11/9/2023 Fair efforts FVC FEV1 and FEV1/FVC are normal FEF 25-75% normal MVV is reduced  Lung volumes by body plethysmography reveal normal TLC and RV/TLC  DLCo and DLCO/Va are normal Airway restistance is increased  Impression Likely normal spirometry, TLC, and DLCO Reduced MVV maybe due to poor efforts or neuromuscular weakness Likely increased airway restistance Recommend clinical correlation  Signatures

## 2024-07-24 NOTE — PHYSICAL EXAM
[Enlarged Base of the Tongue] : enlarged base of the tongue [IV] : Mallampati Class: IV [Normal Appearance] : normal appearance [Supple] : supple [No Neck Mass] : no neck mass [No JVD] : no jvd [Normal Rate/Rhythm] : normal rate/rhythm [Normal S1, S2] : normal s1, s2 [No Murmurs] : no murmurs [No Resp Distress] : no resp distress [No Acc Muscle Use] : no acc muscle use [Clear to Auscultation Bilaterally] : clear to auscultation bilaterally [No Abnormalities] : no abnormalities [Kyphosis] : kyphosis [Scoliosis] : scoliosis [Tenderness: ___] : tenderness: [unfilled] [Surgical scars] : surgical scars [Benign] : benign [Normal Gait] : normal gait [No Clubbing] : no clubbing [No Edema] : no edema [No Rash] : no rash [No Motor Deficits] : no motor deficits [Normal Affect] : normal affect [TextBox_2] : pleasant fn o distress speaking full sentences no cough  [TextBox_11] : crowded no lesions

## 2024-08-26 ENCOUNTER — RX RENEWAL (OUTPATIENT)
Age: 29
End: 2024-08-26

## 2024-10-30 ENCOUNTER — NON-APPOINTMENT (OUTPATIENT)
Age: 29
End: 2024-10-30

## 2024-10-30 ENCOUNTER — APPOINTMENT (OUTPATIENT)
Dept: FAMILY MEDICINE | Facility: CLINIC | Age: 29
End: 2024-10-30
Payer: MEDICAID

## 2024-10-30 VITALS
TEMPERATURE: 96.5 F | DIASTOLIC BLOOD PRESSURE: 84 MMHG | HEART RATE: 109 BPM | BODY MASS INDEX: 29.84 KG/M2 | OXYGEN SATURATION: 98 % | RESPIRATION RATE: 17 BRPM | SYSTOLIC BLOOD PRESSURE: 104 MMHG | WEIGHT: 148 LBS | HEIGHT: 59 IN

## 2024-10-30 DIAGNOSIS — E78.00 PURE HYPERCHOLESTEROLEMIA, UNSPECIFIED: ICD-10-CM

## 2024-10-30 DIAGNOSIS — Z23 ENCOUNTER FOR IMMUNIZATION: ICD-10-CM

## 2024-10-30 DIAGNOSIS — J45.909 UNSPECIFIED ASTHMA, UNCOMPLICATED: ICD-10-CM

## 2024-10-30 DIAGNOSIS — E66.3 OVERWEIGHT: ICD-10-CM

## 2024-10-30 DIAGNOSIS — Z00.00 ENCOUNTER FOR GENERAL ADULT MEDICAL EXAMINATION W/OUT ABNORMAL FINDINGS: ICD-10-CM

## 2024-10-30 PROCEDURE — 90656 IIV3 VACC NO PRSV 0.5 ML IM: CPT

## 2024-10-30 PROCEDURE — 99395 PREV VISIT EST AGE 18-39: CPT | Mod: 25

## 2024-10-30 PROCEDURE — 93000 ELECTROCARDIOGRAM COMPLETE: CPT

## 2024-10-30 PROCEDURE — G0008: CPT

## 2024-10-31 LAB
ALBUMIN SERPL ELPH-MCNC: 4.4 G/DL
ALP BLD-CCNC: 55 U/L
ALT SERPL-CCNC: 16 U/L
ANION GAP SERPL CALC-SCNC: 13 MMOL/L
APPEARANCE: CLEAR
AST SERPL-CCNC: 14 U/L
BACTERIA: NEGATIVE /HPF
BASOPHILS # BLD AUTO: 0.06 K/UL
BASOPHILS NFR BLD AUTO: 1.3 %
BILIRUB SERPL-MCNC: 1 MG/DL
BILIRUBIN URINE: NEGATIVE
BLOOD URINE: NEGATIVE
BUN SERPL-MCNC: 10 MG/DL
CALCIUM SERPL-MCNC: 9.5 MG/DL
CAST: 0 /LPF
CHLORIDE SERPL-SCNC: 104 MMOL/L
CHOLEST SERPL-MCNC: 240 MG/DL
CO2 SERPL-SCNC: 22 MMOL/L
COLOR: YELLOW
CREAT SERPL-MCNC: 0.84 MG/DL
EGFR: 96 ML/MIN/1.73M2
EOSINOPHIL # BLD AUTO: 0.08 K/UL
EOSINOPHIL NFR BLD AUTO: 1.7 %
EPITHELIAL CELLS: 7 /HPF
GLUCOSE QUALITATIVE U: NEGATIVE MG/DL
GLUCOSE SERPL-MCNC: 93 MG/DL
HCT VFR BLD CALC: 40.1 %
HDLC SERPL-MCNC: 49 MG/DL
HGB BLD-MCNC: 13.2 G/DL
IMM GRANULOCYTES NFR BLD AUTO: 0.4 %
KETONES URINE: NEGATIVE MG/DL
LDLC SERPL CALC-MCNC: 168 MG/DL
LEUKOCYTE ESTERASE URINE: NEGATIVE
LYMPHOCYTES # BLD AUTO: 1.5 K/UL
LYMPHOCYTES NFR BLD AUTO: 31.6 %
MAN DIFF?: NORMAL
MCHC RBC-ENTMCNC: 29.5 PG
MCHC RBC-ENTMCNC: 32.9 G/DL
MCV RBC AUTO: 89.7 FL
MICROSCOPIC-UA: NORMAL
MONOCYTES # BLD AUTO: 0.38 K/UL
MONOCYTES NFR BLD AUTO: 8 %
NEUTROPHILS # BLD AUTO: 2.71 K/UL
NEUTROPHILS NFR BLD AUTO: 57 %
NITRITE URINE: NEGATIVE
NONHDLC SERPL-MCNC: 191 MG/DL
PH URINE: 6.5
PLATELET # BLD AUTO: 341 K/UL
POTASSIUM SERPL-SCNC: 4.1 MMOL/L
PROT SERPL-MCNC: 7 G/DL
PROTEIN URINE: NORMAL MG/DL
RBC # BLD: 4.47 M/UL
RBC # FLD: 12.7 %
RED BLOOD CELLS URINE: 1 /HPF
SODIUM SERPL-SCNC: 139 MMOL/L
SPECIFIC GRAVITY URINE: 1.03
TRIGL SERPL-MCNC: 131 MG/DL
TSH SERPL-ACNC: 1.08 UIU/ML
UROBILINOGEN URINE: 0.2 MG/DL
WBC # FLD AUTO: 4.75 K/UL
WHITE BLOOD CELLS URINE: 1 /HPF

## 2024-11-01 ENCOUNTER — RX RENEWAL (OUTPATIENT)
Age: 29
End: 2024-11-01

## 2024-11-12 ENCOUNTER — APPOINTMENT (OUTPATIENT)
Dept: OBGYN | Facility: CLINIC | Age: 29
End: 2024-11-12
Payer: MEDICAID

## 2024-11-12 VITALS
WEIGHT: 148 LBS | TEMPERATURE: 98 F | DIASTOLIC BLOOD PRESSURE: 80 MMHG | OXYGEN SATURATION: 98 % | SYSTOLIC BLOOD PRESSURE: 110 MMHG | HEIGHT: 59 IN | HEART RATE: 112 BPM | BODY MASS INDEX: 29.84 KG/M2

## 2024-11-12 DIAGNOSIS — Z11.3 ENCOUNTER FOR SCREENING FOR INFECTIONS WITH A PREDOMINANTLY SEXUAL MODE OF TRANSMISSION: ICD-10-CM

## 2024-11-12 DIAGNOSIS — N76.0 ACUTE VAGINITIS: ICD-10-CM

## 2024-11-12 LAB
HCG UR QL: NEGATIVE
QUALITY CONTROL: YES

## 2024-11-12 PROCEDURE — 99459 PELVIC EXAMINATION: CPT

## 2024-11-12 PROCEDURE — 99395 PREV VISIT EST AGE 18-39: CPT

## 2024-11-19 NOTE — ED ADULT TRIAGE NOTE - PATIENT ON (OXYGEN DELIVERY METHOD)
OV 11/14/2024:  Assessment:  RICKY (obstructive sleep apnea)   Plan:  Will proceed with sleep endoscopy. He meets BMI and disease severity criteria for hypoglossal nerve stimulation. I discussed the surgery in detail including 2 incisions required, risks of tongue and lip weakness, pneumothorax, numbness, scarring, bleeding, pain, infection, persistent sleep apnea. I discussed the MRI limitations and the need for battery change every 10 years.  He is eager to proceed.   room air

## 2024-11-22 LAB — CYTOLOGY CVX/VAG DOC THIN PREP: NORMAL

## 2024-12-28 ENCOUNTER — EMERGENCY (EMERGENCY)
Facility: HOSPITAL | Age: 29
LOS: 1 days | Discharge: HOME | End: 2024-12-28
Attending: STUDENT IN AN ORGANIZED HEALTH CARE EDUCATION/TRAINING PROGRAM | Admitting: STUDENT IN AN ORGANIZED HEALTH CARE EDUCATION/TRAINING PROGRAM
Payer: MEDICAID

## 2024-12-28 VITALS
DIASTOLIC BLOOD PRESSURE: 62 MMHG | RESPIRATION RATE: 18 BRPM | HEART RATE: 120 BPM | HEIGHT: 60 IN | OXYGEN SATURATION: 98 % | WEIGHT: 143.96 LBS | TEMPERATURE: 98 F | SYSTOLIC BLOOD PRESSURE: 112 MMHG

## 2024-12-28 VITALS
DIASTOLIC BLOOD PRESSURE: 71 MMHG | TEMPERATURE: 97 F | HEART RATE: 105 BPM | OXYGEN SATURATION: 97 % | SYSTOLIC BLOOD PRESSURE: 123 MMHG | RESPIRATION RATE: 17 BRPM

## 2024-12-28 DIAGNOSIS — Z90.89 ACQUIRED ABSENCE OF OTHER ORGANS: Chronic | ICD-10-CM

## 2024-12-28 DIAGNOSIS — Z98.890 OTHER SPECIFIED POSTPROCEDURAL STATES: Chronic | ICD-10-CM

## 2024-12-28 LAB
FLUAV AG NPH QL: DETECTED
FLUBV AG NPH QL: SIGNIFICANT CHANGE UP
RSV RNA NPH QL NAA+NON-PROBE: SIGNIFICANT CHANGE UP
SARS-COV-2 RNA SPEC QL NAA+PROBE: SIGNIFICANT CHANGE UP

## 2024-12-28 PROCEDURE — 87637 SARSCOV2&INF A&B&RSV AMP PRB: CPT

## 2024-12-28 PROCEDURE — 71045 X-RAY EXAM CHEST 1 VIEW: CPT | Mod: 26

## 2024-12-28 PROCEDURE — 99284 EMERGENCY DEPT VISIT MOD MDM: CPT

## 2024-12-28 PROCEDURE — 94640 AIRWAY INHALATION TREATMENT: CPT

## 2024-12-28 PROCEDURE — 99284 EMERGENCY DEPT VISIT MOD MDM: CPT | Mod: 25

## 2024-12-28 PROCEDURE — 71045 X-RAY EXAM CHEST 1 VIEW: CPT

## 2024-12-28 RX ORDER — IPRATROPIUM BROMIDE AND ALBUTEROL SULFATE 2.5; .5 MG/3ML; MG/3ML
3 SOLUTION RESPIRATORY (INHALATION) ONCE
Refills: 0 | Status: COMPLETED | OUTPATIENT
Start: 2024-12-28 | End: 2024-12-28

## 2024-12-28 RX ORDER — ACETAMINOPHEN 500MG 500 MG/1
650 TABLET, COATED ORAL ONCE
Refills: 0 | Status: DISCONTINUED | OUTPATIENT
Start: 2024-12-28 | End: 2025-01-01

## 2024-12-28 RX ORDER — PREDNISONE 20 MG/1
2 TABLET ORAL
Qty: 8 | Refills: 0
Start: 2024-12-28 | End: 2024-12-31

## 2024-12-28 RX ORDER — BENZONATATE 100 MG/1
1 CAPSULE ORAL
Qty: 10 | Refills: 0
Start: 2024-12-28 | End: 2025-01-01

## 2024-12-28 RX ORDER — BENZONATATE 100 MG/1
100 CAPSULE ORAL ONCE
Refills: 0 | Status: COMPLETED | OUTPATIENT
Start: 2024-12-28 | End: 2024-12-28

## 2024-12-28 RX ORDER — PREDNISONE 20 MG/1
40 TABLET ORAL ONCE
Refills: 0 | Status: COMPLETED | OUTPATIENT
Start: 2024-12-28 | End: 2024-12-28

## 2024-12-28 RX ADMIN — PREDNISONE 40 MILLIGRAM(S): 20 TABLET ORAL at 15:44

## 2024-12-28 RX ADMIN — BENZONATATE 100 MILLIGRAM(S): 100 CAPSULE ORAL at 15:44

## 2024-12-28 RX ADMIN — IPRATROPIUM BROMIDE AND ALBUTEROL SULFATE 3 MILLILITER(S): 2.5; .5 SOLUTION RESPIRATORY (INHALATION) at 15:44

## 2024-12-28 NOTE — ED ADULT TRIAGE NOTE - PAIN: PRESENCE, MLM
complains of pain/discomfort DVT PPx: Lovenox discontinued given daughter's concern for trach and PEG stoma bleeding/oozing.  Also with mild thrombocytopenia.  Will continue SCDs.     GOC: Full code  __ long discussion of RCU team with daughter, pt remains FULL CODE, daughter wishes MOLST form reevaluation once pt is less delirious

## 2024-12-28 NOTE — ED PROVIDER NOTE - OBJECTIVE STATEMENT
29-year-old female with a past medical history of asthma, appendectomy presenting with cough for the past day.  Describes clear productive cough and generalized fatigue.  Denies any fevers or chills. Denies any chest pain, abdominal pain, shortness of breath, nausea/vomiting, headaches, fevers, chills,   weakness, syncope,   urinary symptoms, subjective neurological deficits.

## 2024-12-28 NOTE — ED ADULT NURSE NOTE - CAS DISCH BELONGINGS RETURNED
If a colonoscopy was performed you might notice a few drops of blood on your underwear or you might see blood on the toilet paper after you use the bathroom. This is caused by irritation to the bowel during the procedure and is not a problem. However if you have any more heavy bleeding (more than 2 tablespoons of bright red blood) contact your doctor right away.
Yes

## 2024-12-28 NOTE — ED PROVIDER NOTE - PATIENT PORTAL LINK FT
You can access the FollowMyHealth Patient Portal offered by [facility] by registering at the following website: [email or web address]. By joining [facility]’s FollowMyHealth portal, you will also be able to view your health information using other applications (apps) compatible with our system.

## 2024-12-28 NOTE — ED PROVIDER NOTE - NSFOLLOWUPINSTRUCTIONS_ED_ALL_ED_FT
Cough    Coughing is a reflex that clears your throat and your airways. Coughing helps to heal and protect your lungs. It is normal to cough occasionally, but a cough that happens with other symptoms or lasts a long time may be a sign of a condition that needs treatment. Coughing may be caused by infections, asthma or COPD, smoking, postnasal drip, gastroesophageal reflux, as well as other medical conditions. Take medicines only as instructed by your health care provider. Avoid environments or triggers that causes you to cough at work or at home.    SEEK IMMEDIATE MEDICAL CARE IF YOU HAVE ANY OF THE FOLLOWING SYMPTOMS: coughing up blood, shortness of breath, rapid heart rate, chest pain, unexplained weight loss or night sweats.     Rest, drink plenty of fluids.  Advance activity as tolerated.  Continue all previously prescribed medications as directed.  Follow up with your PMD 2-3 days and bring copies of your results.  Return to the ER for worsening symptoms,

## 2024-12-28 NOTE — ED PROVIDER NOTE - PHYSICAL EXAMINATION
VITAL SIGNS: I have reviewed nursing notes and confirm.   GEN: Well-developed; well-nourished; in no acute distress. Speaking full sentences.  SKIN: Warm, pink, no rash, no diaphoresis, no cyanosis, well perfused.   HEAD: Normocephalic; atraumatic.    NECK: Supple; non tender. Full range of motion.   EYES: Pupils 3mm equal, round, reactive to light and accomodation, conjunctiva and sclera clear. Extra-ocular movements intact bilaterally.  ENT: No nasal discharge; airway clear. Trachea is midline.    CV: RRR. S1, S2 normal; no murmurs, gallops, or rubs. Capillary refill < 2 seconds throughout. Distal pulses intact 2+ throughout.  RESP: CTA bilaterally. No wheezes, rales, or rhonchi.   ABD: Normal bowel sounds, soft, non-distended, non-tender, no rebound, no guarding   MSK: Normal range of motion and movement of all 4 extremities. No apparent joint or muscular pain throughout.    BACK: No thoracolumbar midline or paravertebral tenderness.    NEURO: Alert & oriented x 3, Grossly unremarkable. Sensory and motor intact throughout. No focal deficits.  Normal speech and coordination.

## 2024-12-28 NOTE — ED PROVIDER NOTE - CLINICAL SUMMARY MEDICAL DECISION MAKING FREE TEXT BOX
29-year-old female with a past medical history of asthma, appendectomy presenting with cough for the past day.  Describes clear productive cough and generalized fatigue.  Denies any fevers or chills. Denies any chest pain, abdominal pain, shortness of breath, nausea/vomiting, headaches, fevers, chills,   weakness, syncope,   urinary symptoms, subjective neurological deficits.     AP: This patient with PMHx of asthma presents with acute cough a/w sick contact mother with flu (+), most consistent with viral illness.   DDx includes but is not limited to: pna, viral illness, bronchitis?  PLAN: CXR, swab, supportive care, duonebs PRN, antitussives PRN, reassess.  ED Course:  Improved  Disposition: Likely discharge 29-year-old female with a past medical history of asthma, appendectomy presenting with cough for the past day.  Describes clear productive cough and generalized fatigue.  Denies any fevers or chills. Denies any chest pain, abdominal pain, shortness of breath, nausea/vomiting, headaches, fevers, chills,   weakness, syncope,   urinary symptoms, subjective neurological deficits.     AP: This patient with PMHx of asthma presents with acute cough a/w sick contact mother with flu (+), most consistent with viral illness.   DDx includes but is not limited to: pna, viral illness, bronchitis?    PLAN: CXR, swab, supportive care, duonebs PRN, antitussives PRN, reassess.    ED Course:  Improved    Disposition: Likely discharge

## 2024-12-28 NOTE — ED ADULT NURSE NOTE - OBJECTIVE STATEMENT
29 yr old female to ED for complaints of cough. 29 yr old female to ED for complaints of cough and throat pain. Denies fevers, chest pain, shortness of breath.

## 2024-12-29 NOTE — ED POST DISCHARGE NOTE - DETAILS
lm#1 sts she cannot think of what that could be if artifact. advised rpt cxr and will she sts with PMD

## 2025-01-22 ENCOUNTER — RX RENEWAL (OUTPATIENT)
Age: 30
End: 2025-01-22

## 2025-01-30 ENCOUNTER — APPOINTMENT (OUTPATIENT)
Dept: PULMONOLOGY | Facility: CLINIC | Age: 30
End: 2025-01-30
Payer: MEDICAID

## 2025-01-30 DIAGNOSIS — J45.909 UNSPECIFIED ASTHMA, UNCOMPLICATED: ICD-10-CM

## 2025-01-30 PROCEDURE — 94060 EVALUATION OF WHEEZING: CPT

## 2025-01-31 ENCOUNTER — NON-APPOINTMENT (OUTPATIENT)
Age: 30
End: 2025-01-31

## 2025-02-13 ENCOUNTER — RX CHANGE (OUTPATIENT)
Age: 30
End: 2025-02-13

## 2025-02-13 RX ORDER — BUDESONIDE AND FORMOTEROL FUMARATE DIHYDRATE 80; 4.5 UG/1; UG/1
80-4.5 AEROSOL RESPIRATORY (INHALATION) TWICE DAILY
Qty: 3 | Refills: 3 | Status: ACTIVE | COMMUNITY
Start: 1900-01-01 | End: 1900-01-01

## 2025-03-04 NOTE — ASU PREOP CHECKLIST - ALLERGY BAND ON
Patient attended session 10 of outpatient Phase 2 pulmonary rehab. See scanned in exercise session report in media tab.    
no known allergies

## 2025-03-25 ENCOUNTER — APPOINTMENT (OUTPATIENT)
Dept: PULMONOLOGY | Facility: CLINIC | Age: 30
End: 2025-03-25
Payer: MEDICAID

## 2025-03-25 VITALS
DIASTOLIC BLOOD PRESSURE: 70 MMHG | WEIGHT: 148 LBS | HEIGHT: 59 IN | BODY MASS INDEX: 29.84 KG/M2 | TEMPERATURE: 97.8 F | SYSTOLIC BLOOD PRESSURE: 120 MMHG

## 2025-03-25 DIAGNOSIS — J45.909 UNSPECIFIED ASTHMA, UNCOMPLICATED: ICD-10-CM

## 2025-03-25 PROCEDURE — 95012 NITRIC OXIDE EXP GAS DETER: CPT

## 2025-03-25 PROCEDURE — 99213 OFFICE O/P EST LOW 20 MIN: CPT | Mod: 25

## 2025-03-25 RX ORDER — IPRATROPIUM BROMIDE AND ALBUTEROL SULFATE 2.5; .5 MG/3ML; MG/3ML
0.5-2.5 (3) SOLUTION RESPIRATORY (INHALATION)
Qty: 1620 | Refills: 3 | Status: ACTIVE | COMMUNITY
Start: 2025-03-25 | End: 1900-01-01

## 2025-05-19 ENCOUNTER — RX RENEWAL (OUTPATIENT)
Age: 30
End: 2025-05-19

## 2025-06-09 NOTE — ED ADULT NURSE NOTE - SEPSIS SCREEN SUSPECTED INFECTION, MLM
Physical Therapy    Visit Type: initial evaluation, treatment and discharge  SUBJECTIVE  Patient verbally agrees to allow the following to be present during session: student and mother  Pt was initially seen at 8am, but felt nauseous and dizzy at EOB and needed to lay back down. Returned at 10:30am and pt had no signs of dizziness or nausea. Reported L ankle pain as 7/10.   Prior treatment in the past year for same condition: no therapies  Patient has not been hospitalized, in a skilled nursing facility, or seen by home health in the last 30 days.  Patient / Family Goal: return to previous functional status and return home    Pain     Location: L ankle    At onset of session (out of 10): 7     OBJECTIVE      Patient Activity Tolerance: 2 to 1 activity to rest       Sitting Balance  (NABOR = base of support)  Static      - Trial 1 details: modified independent  Dynamic      - Trial 1 details: modified independent    Standing Balance  (NABOR = base of support)  Firm Surface: Double Leg      - Static, Eyes Open       - Trial 1 details: modified independent and with double UE support     - Dynamic, Eyes Open       - Trial 1 details: modified independent and with double UE support       Bed Mobility  - Supine to sit: modified independent  - Sit to supine: modified independent  Transfers  Assistive devices: gait belt, bilateral crutches  - Sit to stand: modified independent  - Stand to sit: modified independent    Ambulation / Gait  - Assistive device: gait belt and bilateral crutches  - Distance (feet unless otherwise indicated): 100  - Assist Level: modified independent  - Surface: even  - Description: forward flexed at hips and step to    Stair Ambulation  - Number of steps: 3;   - Assist Level: supervision  - Railing: right  - Devices Used: bilateral crutches      Interventions     Skilled input: Verbal instruction/cues  Verbal Consent: Writer verbally educated and received verbal consent for hand placement, positioning  of patient, and techniques to be performed today from patient for therapist position for techniques, clothing adjustments for techniques and hand placement and palpation for techniques as described above and how they are pertinent to the patient's plan of care.         Education:   - Present and ready to learn: patient and patient's parent(s)  Education provided during session:  - use of assistive device, transfer technique, gait training, precautions and role of PT  - Results of above outlined education: Verbalizes understanding and Demonstrates understanding    ASSESSMENT   Patient will benefit from skilled therapy to address listed impairments and functional limitations.  Progress: goals met  Interfering components: weight bearing status    Discharge needs based on today's assessment:   - Current level of function: slightly below baseline level of function   - Therapy needs at discharge: outpatient therapy 1-3 times per week   - Instrumental activities of daily living (IADLs) requiring support at discharge: community mobility and home management   - Impairments that require further therapy intervention: pain    Triaged Out  - Discipline: None  AM-PAC  - Generalized Prior Level of Function: IND/MOD I ([facility] 22-24)       Key: MOD A=moderate assistance, IND/MOD I=independent/modified independent  - Generalized Current Level of Function     - Current Mobility Score: 23       AM-PAC Scoring Key= >21 Modified Independent; 20-21 Supervision; 18-19 Minimal assist; 13-17 Moderate assist; 9-12 Max assist; <9 Total assist        Predicted patient presentation: Low (stable) - Patient comorbidities and complexities, as defined above, will have little effect on progress for prescribed plan of care.    PLAN (while hospitalized)  Suggestions for next session as indicated: PT Frequency: DC PT      PT/OT Mobility Equipment for Discharge: Crutches     Interventions: balance, bed mobility, body mechanics, community reintegration,  continued evaluation, endurance training, energy conservation, equipment eval/education, functional transfer training, gait training, HEP train/position, patient/family training, ROM, strengthening, safety education and stairs retraining  Agreement to plan and goals: patient agrees with goals and treatment plan        GOALS  Review Date: 6/16/2025  Long Term Goals: (to be met by time of discharge from hospital)  Sit to supine: Patient will complete sit to supine modified independent.  Status: met   Supine to sit: Patient will complete supine to sit modified independent.  Status: met   Sit to stand: Patient will complete sit to stand transfer with gait belt and bilateral crutches, modified independent.   Status: met   Ambulation (even): Patient will ambulate on even surface for 100 feet with gait belt and bilateral crutches, modified independent.   Status: met   3-4 steps: Patient will ambulate 3-4 steps with gait belt and bilateral crutches using one rail, supervision.   Status: met   Documented in the chart in the following areas:  Assessment/Plan.           The referring provider's electronic signature on the evaluation authorizes the therapy plan of care and certifies the need for these services, furnished under this plan of care while under their care.      Patient at End of Session:   Location: in bed  Safety measures: call light within reach  Handoff to: nurse      I was in the immediate presence of the student and directed the student’s performance of the services. I am responsible for all treatment, assessment, documentation, and billing rendered for this patient.   Humphrey Ma, PT          Therapy procedure time and total treatment time can be found documented on the Time Entry flowsheet   No

## 2025-06-16 ENCOUNTER — RX RENEWAL (OUTPATIENT)
Age: 30
End: 2025-06-16

## 2025-06-26 NOTE — ED ADULT NURSE NOTE - NSFALLCONCLUSION_ED_ALL_ED
Spoke with pt regarding message rec'd from PCP's office.  Pt informed Dr Peterson out of office until week of 7/7/2025. Offered sooner appt but pt wanted to wait and see Dr Peterson. Appt scheduled for 7/8/2025 with Dr Peterson. All questions appear to be addressed at this time.     Universal Safety Interventions

## 2025-07-03 NOTE — ED PROVIDER NOTE - CROS ED ROS STATEMENT
Goal Outcome Evaluation:Patient being discharged home with all belongings. Scripts sent to patient's home pharmacy. Discharge instructions and updated medication list provided to patient. Family providing transportation home.                                              all other ROS negative except as per HPI

## 2025-07-31 ENCOUNTER — RX RENEWAL (OUTPATIENT)
Age: 30
End: 2025-07-31

## 2025-08-07 RX ORDER — FLUTICASONE PROPIONATE 50 UG/1
50 SPRAY NASAL DAILY
Qty: 1 | Refills: 4 | Status: ACTIVE | COMMUNITY
Start: 2025-08-07 | End: 1900-01-01

## 2025-08-12 ENCOUNTER — RX RENEWAL (OUTPATIENT)
Age: 30
End: 2025-08-12

## 2025-08-15 ENCOUNTER — RX RENEWAL (OUTPATIENT)
Age: 30
End: 2025-08-15

## 2025-08-20 ENCOUNTER — RX RENEWAL (OUTPATIENT)
Age: 30
End: 2025-08-20

## (undated) DEVICE — GLV 7 PROTEXIS (WHITE)

## (undated) DEVICE — SPECIMEN CONTAINER 100ML

## (undated) DEVICE — DRAPE LIGHT HANDLE COVER (GREEN)

## (undated) DEVICE — SOL IRR POUR NS 0.9% 500ML

## (undated) DEVICE — SOL IRR GLYCINE 1.5% 3000L

## (undated) DEVICE — WARMING BLANKET UPPER ADULT

## (undated) DEVICE — TUBING STRYKER HYSTEROSCOPY INFLOW OUTFLOW

## (undated) DEVICE — PACK LITHOTOMY